# Patient Record
Sex: FEMALE | Race: WHITE | NOT HISPANIC OR LATINO | Employment: OTHER | URBAN - NONMETROPOLITAN AREA
[De-identification: names, ages, dates, MRNs, and addresses within clinical notes are randomized per-mention and may not be internally consistent; named-entity substitution may affect disease eponyms.]

---

## 2017-01-25 ENCOUNTER — TRANSCRIBE ORDERS (OUTPATIENT)
Dept: ADMINISTRATIVE | Facility: HOSPITAL | Age: 67
End: 2017-01-25

## 2017-01-25 DIAGNOSIS — Z12.31 VISIT FOR SCREENING MAMMOGRAM: Primary | ICD-10-CM

## 2017-01-30 ENCOUNTER — HOSPITAL ENCOUNTER (OUTPATIENT)
Dept: MAMMOGRAPHY | Facility: HOSPITAL | Age: 67
Discharge: HOME | End: 2017-01-30
Attending: FAMILY MEDICINE | Admitting: FAMILY MEDICINE

## 2017-01-30 DIAGNOSIS — Z12.31 VISIT FOR SCREENING MAMMOGRAM: ICD-10-CM

## 2017-01-30 PROCEDURE — 77063 BREAST TOMOSYNTHESIS BI: CPT

## 2017-01-30 PROCEDURE — G0202 SCR MAMMO BI INCL CAD: HCPCS | Performed by: RADIOLOGY

## 2017-01-30 PROCEDURE — G0202 SCR MAMMO BI INCL CAD: HCPCS

## 2017-01-30 PROCEDURE — 77063 BREAST TOMOSYNTHESIS BI: CPT | Performed by: RADIOLOGY

## 2017-02-06 ENCOUNTER — TRANSCRIBE ORDERS (OUTPATIENT)
Dept: ADMINISTRATIVE | Facility: HOSPITAL | Age: 67
End: 2017-02-06

## 2017-02-06 DIAGNOSIS — M79.671 RIGHT FOOT PAIN: Primary | ICD-10-CM

## 2017-02-10 ENCOUNTER — HOSPITAL ENCOUNTER (OUTPATIENT)
Dept: NUCLEAR MEDICINE | Facility: HOSPITAL | Age: 67
Discharge: HOME | End: 2017-02-10

## 2017-02-10 DIAGNOSIS — M79.671 RIGHT FOOT PAIN: ICD-10-CM

## 2017-02-10 PROCEDURE — A9561 TC99M OXIDRONATE: HCPCS | Performed by: NURSE PRACTITIONER

## 2017-02-10 PROCEDURE — 78300 BONE IMAGING LIMITED AREA: CPT | Performed by: RADIOLOGY

## 2017-02-10 PROCEDURE — 0 TECHNETIUM OXIDRONATE KIT: Performed by: NURSE PRACTITIONER

## 2017-02-10 PROCEDURE — 78300 BONE IMAGING LIMITED AREA: CPT

## 2017-02-10 RX ADMIN — TECHNETIUM TC 99M OXIDRONATE 1 DOSE: 3.15 INJECTION, POWDER, LYOPHILIZED, FOR SOLUTION INTRAVENOUS at 07:10

## 2017-03-02 ENCOUNTER — TRANSCRIBE ORDERS (OUTPATIENT)
Dept: ADMINISTRATIVE | Facility: HOSPITAL | Age: 67
End: 2017-03-02

## 2017-03-02 DIAGNOSIS — M84.374A STRESS FRACTURE OF RIGHT FOOT, INITIAL ENCOUNTER: ICD-10-CM

## 2017-03-02 DIAGNOSIS — R60.9 SWELLING: Primary | ICD-10-CM

## 2017-03-07 ENCOUNTER — HOSPITAL ENCOUNTER (OUTPATIENT)
Dept: CARDIOLOGY | Facility: HOSPITAL | Age: 67
Discharge: HOME | End: 2017-03-07
Attending: FAMILY MEDICINE | Admitting: FAMILY MEDICINE

## 2017-03-07 ENCOUNTER — HOSPITAL ENCOUNTER (OUTPATIENT)
Dept: MRI IMAGING | Facility: HOSPITAL | Age: 67
Discharge: HOME | End: 2017-03-07
Attending: FAMILY MEDICINE

## 2017-03-07 DIAGNOSIS — R60.9 SWELLING: ICD-10-CM

## 2017-03-07 DIAGNOSIS — M84.374A STRESS FRACTURE OF RIGHT FOOT, INITIAL ENCOUNTER: ICD-10-CM

## 2017-03-07 PROCEDURE — 93971 EXTREMITY STUDY: CPT

## 2017-03-07 PROCEDURE — 73718 MRI LOWER EXTREMITY W/O DYE: CPT | Performed by: RADIOLOGY

## 2017-03-07 PROCEDURE — 73718 MRI LOWER EXTREMITY W/O DYE: CPT

## 2017-03-07 PROCEDURE — 93971 EXTREMITY STUDY: CPT | Performed by: RADIOLOGY

## 2017-08-13 ENCOUNTER — HOSPITAL ENCOUNTER (INPATIENT)
Facility: HOSPITAL | Age: 67
LOS: 1 days | Discharge: ANOTHER HOSPITAL | End: 2017-08-15
Attending: FAMILY MEDICINE | Admitting: FAMILY MEDICINE

## 2017-08-13 ENCOUNTER — APPOINTMENT (OUTPATIENT)
Dept: GENERAL RADIOLOGY | Facility: HOSPITAL | Age: 67
End: 2017-08-13

## 2017-08-13 DIAGNOSIS — R07.9 CHEST PAIN AT REST: Primary | ICD-10-CM

## 2017-08-13 LAB
ALBUMIN SERPL-MCNC: 4.5 G/DL (ref 3.4–4.8)
ALBUMIN/GLOB SERPL: 1.9 G/DL (ref 1.5–2.5)
ALP SERPL-CCNC: 90 U/L (ref 35–104)
ALT SERPL W P-5'-P-CCNC: 25 U/L (ref 10–36)
ANION GAP SERPL CALCULATED.3IONS-SCNC: 7.5 MMOL/L (ref 3.6–11.2)
APTT PPP: 29.9 SECONDS (ref 23.8–36.1)
AST SERPL-CCNC: 23 U/L (ref 10–30)
BASOPHILS # BLD AUTO: 0.01 10*3/MM3 (ref 0–0.3)
BASOPHILS # BLD AUTO: 0.01 10*3/MM3 (ref 0–0.3)
BASOPHILS NFR BLD AUTO: 0.2 % (ref 0–2)
BASOPHILS NFR BLD AUTO: 0.2 % (ref 0–2)
BILIRUB SERPL-MCNC: 0.8 MG/DL (ref 0.2–1.8)
BUN BLD-MCNC: 14 MG/DL (ref 7–21)
BUN/CREAT SERPL: 19.4 (ref 7–25)
CALCIUM SPEC-SCNC: 9.5 MG/DL (ref 7.7–10)
CHLORIDE SERPL-SCNC: 106 MMOL/L (ref 99–112)
CHOLEST SERPL-MCNC: 242 MG/DL (ref 0–200)
CK MB SERPL-CCNC: 11.35 NG/ML (ref 0–5)
CK MB SERPL-CCNC: 5.9 NG/ML (ref 0–5)
CK MB SERPL-RTO: 4.5 % (ref 0–3)
CK MB SERPL-RTO: 8.2 % (ref 0–3)
CK SERPL-CCNC: 130 U/L (ref 24–173)
CK SERPL-CCNC: 138 U/L (ref 24–173)
CO2 SERPL-SCNC: 27.5 MMOL/L (ref 24.3–31.9)
CREAT BLD-MCNC: 0.72 MG/DL (ref 0.43–1.29)
DEPRECATED RDW RBC AUTO: 44.8 FL (ref 37–54)
DEPRECATED RDW RBC AUTO: 45.3 FL (ref 37–54)
EOSINOPHIL # BLD AUTO: 0.03 10*3/MM3 (ref 0–0.7)
EOSINOPHIL # BLD AUTO: 0.09 10*3/MM3 (ref 0–0.7)
EOSINOPHIL NFR BLD AUTO: 0.5 % (ref 0–7)
EOSINOPHIL NFR BLD AUTO: 2.1 % (ref 0–7)
ERYTHROCYTE [DISTWIDTH] IN BLOOD BY AUTOMATED COUNT: 14.2 % (ref 11.5–14.5)
ERYTHROCYTE [DISTWIDTH] IN BLOOD BY AUTOMATED COUNT: 14.3 % (ref 11.5–14.5)
GFR SERPL CREATININE-BSD FRML MDRD: 81 ML/MIN/1.73
GLOBULIN UR ELPH-MCNC: 2.4 GM/DL
GLUCOSE BLD-MCNC: 183 MG/DL (ref 70–110)
HCT VFR BLD AUTO: 39.7 % (ref 37–47)
HCT VFR BLD AUTO: 40.3 % (ref 37–47)
HDLC SERPL-MCNC: 42 MG/DL (ref 60–100)
HGB BLD-MCNC: 12.9 G/DL (ref 12–16)
HGB BLD-MCNC: 12.9 G/DL (ref 12–16)
HOLD SPECIMEN: NORMAL
HOLD SPECIMEN: NORMAL
IMM GRANULOCYTES # BLD: 0 10*3/MM3 (ref 0–0.03)
IMM GRANULOCYTES # BLD: 0.01 10*3/MM3 (ref 0–0.03)
IMM GRANULOCYTES NFR BLD: 0 % (ref 0–0.5)
IMM GRANULOCYTES NFR BLD: 0.2 % (ref 0–0.5)
INR PPP: 0.96 (ref 0.9–1.1)
INR PPP: 0.97 (ref 0.9–1.1)
LDLC SERPL CALC-MCNC: 167 MG/DL (ref 0–100)
LDLC/HDLC SERPL: 3.97 {RATIO}
LYMPHOCYTES # BLD AUTO: 1.1 10*3/MM3 (ref 1–3)
LYMPHOCYTES # BLD AUTO: 1.14 10*3/MM3 (ref 1–3)
LYMPHOCYTES NFR BLD AUTO: 20.8 % (ref 16–46)
LYMPHOCYTES NFR BLD AUTO: 25.3 % (ref 16–46)
MCH RBC QN AUTO: 28 PG (ref 27–33)
MCH RBC QN AUTO: 28.3 PG (ref 27–33)
MCHC RBC AUTO-ENTMCNC: 32 G/DL (ref 33–37)
MCHC RBC AUTO-ENTMCNC: 32.5 G/DL (ref 33–37)
MCV RBC AUTO: 87.1 FL (ref 80–94)
MCV RBC AUTO: 87.4 FL (ref 80–94)
MONOCYTES # BLD AUTO: 0.28 10*3/MM3 (ref 0.1–0.9)
MONOCYTES # BLD AUTO: 0.29 10*3/MM3 (ref 0.1–0.9)
MONOCYTES NFR BLD AUTO: 5.3 % (ref 0–12)
MONOCYTES NFR BLD AUTO: 6.5 % (ref 0–12)
NEUTROPHILS # BLD AUTO: 2.86 10*3/MM3 (ref 1.4–6.5)
NEUTROPHILS # BLD AUTO: 4 10*3/MM3 (ref 1.4–6.5)
NEUTROPHILS NFR BLD AUTO: 65.9 % (ref 40–75)
NEUTROPHILS NFR BLD AUTO: 73 % (ref 40–75)
OSMOLALITY SERPL CALC.SUM OF ELEC: 286.4 MOSM/KG (ref 273–305)
PLATELET # BLD AUTO: 157 10*3/MM3 (ref 130–400)
PLATELET # BLD AUTO: 160 10*3/MM3 (ref 130–400)
PMV BLD AUTO: 11 FL (ref 6–10)
PMV BLD AUTO: 11.3 FL (ref 6–10)
POTASSIUM BLD-SCNC: 3.9 MMOL/L (ref 3.5–5.3)
PROT SERPL-MCNC: 6.9 G/DL (ref 6–8)
PROTHROMBIN TIME: 12.9 SECONDS (ref 11–15.4)
PROTHROMBIN TIME: 13 SECONDS (ref 11–15.4)
RBC # BLD AUTO: 4.56 10*6/MM3 (ref 4.2–5.4)
RBC # BLD AUTO: 4.61 10*6/MM3 (ref 4.2–5.4)
SODIUM BLD-SCNC: 141 MMOL/L (ref 135–153)
TRIGL SERPL-MCNC: 166 MG/DL (ref 0–150)
TROPONIN I SERPL-MCNC: 0.27 NG/ML
TROPONIN I SERPL-MCNC: 2.98 NG/ML
TROPONIN I SERPL-MCNC: 4.92 NG/ML
VLDLC SERPL-MCNC: 33.2 MG/DL
WBC NRBC COR # BLD: 4.34 10*3/MM3 (ref 4.5–12.5)
WBC NRBC COR # BLD: 5.48 10*3/MM3 (ref 4.5–12.5)
WHOLE BLOOD HOLD SPECIMEN: NORMAL
WHOLE BLOOD HOLD SPECIMEN: NORMAL

## 2017-08-13 PROCEDURE — 71010 HC CHEST PA OR AP: CPT

## 2017-08-13 PROCEDURE — 93005 ELECTROCARDIOGRAM TRACING: CPT | Performed by: INTERNAL MEDICINE

## 2017-08-13 PROCEDURE — 84484 ASSAY OF TROPONIN QUANT: CPT | Performed by: FAMILY MEDICINE

## 2017-08-13 PROCEDURE — G0378 HOSPITAL OBSERVATION PER HR: HCPCS

## 2017-08-13 PROCEDURE — 85730 THROMBOPLASTIN TIME PARTIAL: CPT | Performed by: INTERNAL MEDICINE

## 2017-08-13 PROCEDURE — 71010 XR CHEST 1 VW: CPT | Performed by: RADIOLOGY

## 2017-08-13 PROCEDURE — 94799 UNLISTED PULMONARY SVC/PX: CPT

## 2017-08-13 PROCEDURE — 80053 COMPREHEN METABOLIC PANEL: CPT | Performed by: PHYSICIAN ASSISTANT

## 2017-08-13 PROCEDURE — 99284 EMERGENCY DEPT VISIT MOD MDM: CPT

## 2017-08-13 PROCEDURE — 82550 ASSAY OF CK (CPK): CPT | Performed by: FAMILY MEDICINE

## 2017-08-13 PROCEDURE — 99222 1ST HOSP IP/OBS MODERATE 55: CPT | Performed by: INTERNAL MEDICINE

## 2017-08-13 PROCEDURE — 80061 LIPID PANEL: CPT | Performed by: PHYSICIAN ASSISTANT

## 2017-08-13 PROCEDURE — 93010 ELECTROCARDIOGRAM REPORT: CPT | Performed by: INTERNAL MEDICINE

## 2017-08-13 PROCEDURE — 85025 COMPLETE CBC W/AUTO DIFF WBC: CPT | Performed by: INTERNAL MEDICINE

## 2017-08-13 PROCEDURE — 25010000002 HEPARIN (PORCINE) PER 1000 UNITS

## 2017-08-13 PROCEDURE — 85610 PROTHROMBIN TIME: CPT | Performed by: INTERNAL MEDICINE

## 2017-08-13 PROCEDURE — 82553 CREATINE MB FRACTION: CPT | Performed by: FAMILY MEDICINE

## 2017-08-13 PROCEDURE — 93005 ELECTROCARDIOGRAM TRACING: CPT | Performed by: PHYSICIAN ASSISTANT

## 2017-08-13 PROCEDURE — 82553 CREATINE MB FRACTION: CPT | Performed by: PHYSICIAN ASSISTANT

## 2017-08-13 PROCEDURE — 82550 ASSAY OF CK (CPK): CPT | Performed by: PHYSICIAN ASSISTANT

## 2017-08-13 PROCEDURE — 85025 COMPLETE CBC W/AUTO DIFF WBC: CPT | Performed by: PHYSICIAN ASSISTANT

## 2017-08-13 PROCEDURE — 84484 ASSAY OF TROPONIN QUANT: CPT | Performed by: PHYSICIAN ASSISTANT

## 2017-08-13 PROCEDURE — 85610 PROTHROMBIN TIME: CPT | Performed by: PHYSICIAN ASSISTANT

## 2017-08-13 RX ORDER — ATORVASTATIN CALCIUM 40 MG/1
80 TABLET, FILM COATED ORAL NIGHTLY
Status: DISCONTINUED | OUTPATIENT
Start: 2017-08-14 | End: 2017-08-15 | Stop reason: HOSPADM

## 2017-08-13 RX ORDER — HEPARIN SODIUM 5000 [USP'U]/ML
5000 INJECTION, SOLUTION INTRAVENOUS; SUBCUTANEOUS EVERY 8 HOURS SCHEDULED
Status: DISCONTINUED | OUTPATIENT
Start: 2017-08-13 | End: 2017-08-14

## 2017-08-13 RX ORDER — HEPARIN SODIUM 5000 [USP'U]/ML
30 INJECTION, SOLUTION INTRAVENOUS; SUBCUTANEOUS AS NEEDED
Status: DISCONTINUED | OUTPATIENT
Start: 2017-08-13 | End: 2017-08-14

## 2017-08-13 RX ORDER — LISINOPRIL 10 MG/1
40 TABLET ORAL DAILY
Status: DISCONTINUED | OUTPATIENT
Start: 2017-08-14 | End: 2017-08-15 | Stop reason: HOSPADM

## 2017-08-13 RX ORDER — PANTOPRAZOLE SODIUM 40 MG/1
40 TABLET, DELAYED RELEASE ORAL
Status: DISCONTINUED | OUTPATIENT
Start: 2017-08-14 | End: 2017-08-15 | Stop reason: HOSPADM

## 2017-08-13 RX ORDER — ASPIRIN 325 MG
325 TABLET ORAL ONCE
Status: COMPLETED | OUTPATIENT
Start: 2017-08-13 | End: 2017-08-13

## 2017-08-13 RX ORDER — LISINOPRIL 40 MG/1
40 TABLET ORAL DAILY
COMMUNITY
End: 2017-08-17 | Stop reason: HOSPADM

## 2017-08-13 RX ORDER — PANTOPRAZOLE SODIUM 40 MG/1
40 TABLET, DELAYED RELEASE ORAL 2 TIMES DAILY PRN
COMMUNITY

## 2017-08-13 RX ORDER — NITROGLYCERIN 0.4 MG/1
0.4 TABLET SUBLINGUAL
Status: DISCONTINUED | OUTPATIENT
Start: 2017-08-13 | End: 2017-08-15 | Stop reason: HOSPADM

## 2017-08-13 RX ORDER — ASPIRIN 81 MG/1
81 TABLET ORAL DAILY
COMMUNITY
End: 2022-04-04 | Stop reason: SDUPTHER

## 2017-08-13 RX ORDER — HEPARIN SODIUM 5000 [USP'U]/ML
60 INJECTION, SOLUTION INTRAVENOUS; SUBCUTANEOUS ONCE
Status: DISCONTINUED | OUTPATIENT
Start: 2017-08-13 | End: 2017-08-14

## 2017-08-13 RX ORDER — ZOLPIDEM TARTRATE 5 MG/1
5 TABLET ORAL NIGHTLY PRN
Status: DISCONTINUED | OUTPATIENT
Start: 2017-08-13 | End: 2017-08-15 | Stop reason: HOSPADM

## 2017-08-13 RX ORDER — CARVEDILOL 3.12 MG/1
3.12 TABLET ORAL EVERY 12 HOURS SCHEDULED
Status: DISCONTINUED | OUTPATIENT
Start: 2017-08-14 | End: 2017-08-15 | Stop reason: HOSPADM

## 2017-08-13 RX ORDER — HEPARIN SODIUM 5000 [USP'U]/ML
60 INJECTION, SOLUTION INTRAVENOUS; SUBCUTANEOUS AS NEEDED
Status: DISCONTINUED | OUTPATIENT
Start: 2017-08-13 | End: 2017-08-14

## 2017-08-13 RX ORDER — SODIUM CHLORIDE 0.9 % (FLUSH) 0.9 %
10 SYRINGE (ML) INJECTION AS NEEDED
Status: DISCONTINUED | OUTPATIENT
Start: 2017-08-13 | End: 2017-08-15 | Stop reason: HOSPADM

## 2017-08-13 RX ORDER — CHLORAL HYDRATE 500 MG
1000 CAPSULE ORAL
COMMUNITY

## 2017-08-13 RX ORDER — CLOPIDOGREL BISULFATE 75 MG/1
300 TABLET ORAL ONCE
Status: COMPLETED | OUTPATIENT
Start: 2017-08-13 | End: 2017-08-13

## 2017-08-13 RX ORDER — CLOPIDOGREL BISULFATE 75 MG/1
TABLET ORAL
Status: COMPLETED
Start: 2017-08-13 | End: 2017-08-13

## 2017-08-13 RX ORDER — ASPIRIN 81 MG/1
81 TABLET ORAL DAILY
Status: DISCONTINUED | OUTPATIENT
Start: 2017-08-14 | End: 2017-08-15 | Stop reason: HOSPADM

## 2017-08-13 RX ORDER — NITROGLYCERIN 0.4 MG/1
0.4 TABLET SUBLINGUAL
Status: DISCONTINUED | OUTPATIENT
Start: 2017-08-13 | End: 2017-08-13 | Stop reason: SDUPTHER

## 2017-08-13 RX ADMIN — CLOPIDOGREL BISULFATE 300 MG: 75 TABLET ORAL at 22:09

## 2017-08-13 RX ADMIN — CLOPIDOGREL BISULFATE 300 MG: 75 TABLET, FILM COATED ORAL at 22:09

## 2017-08-13 RX ADMIN — ASPIRIN 325 MG: 325 TABLET ORAL at 18:52

## 2017-08-13 RX ADMIN — NITROGLYCERIN 1 INCH: 20 OINTMENT TOPICAL at 19:37

## 2017-08-13 RX ADMIN — NITROGLYCERIN 0.4 MG: 0.4 TABLET SUBLINGUAL at 19:02

## 2017-08-13 RX ADMIN — NITROGLYCERIN 0.4 MG: 0.4 TABLET SUBLINGUAL at 18:52

## 2017-08-13 RX ADMIN — NITROGLYCERIN 0.4 MG: 0.4 TABLET SUBLINGUAL at 18:57

## 2017-08-13 RX ADMIN — HEPARIN SODIUM 12 UNITS/KG/HR: 10000 INJECTION, SOLUTION INTRAVENOUS at 22:11

## 2017-08-13 NOTE — ED PROVIDER NOTES
Subjective   Patient is a 66 y.o. female presenting with chest pain.   Chest Pain   Pain location:  Substernal area  Pain quality: tightness    Pain radiates to:  Neck  Onset quality:  Gradual  Duration:  11 hours  Timing:  Intermittent  Progression:  Waxing and waning  Chronicity:  New  Context: not breathing    Relieved by:  Nothing  Worsened by:  Nothing  Associated symptoms: diaphoresis, nausea and shortness of breath    Associated symptoms: no abdominal pain, no fever and no vomiting    Risk factors: hypertension        Review of Systems   Constitutional: Positive for diaphoresis. Negative for fever.   HENT: Negative.    Respiratory: Positive for shortness of breath.    Cardiovascular: Positive for chest pain.   Gastrointestinal: Positive for nausea. Negative for abdominal pain and vomiting.   Endocrine: Negative.    Genitourinary: Negative.  Negative for dysuria.   Skin: Negative.    Neurological: Negative.    Psychiatric/Behavioral: Negative.    All other systems reviewed and are negative.      Past Medical History:   Diagnosis Date   • GERD (gastroesophageal reflux disease)    • Hypertension        Allergies   Allergen Reactions   • Codeine        Past Surgical History:   Procedure Laterality Date   • HYSTERECTOMY         History reviewed. No pertinent family history.    Social History     Social History   • Marital status:      Spouse name: N/A   • Number of children: N/A   • Years of education: N/A     Social History Main Topics   • Smoking status: Never Smoker   • Smokeless tobacco: None   • Alcohol use No   • Drug use: No   • Sexual activity: Not Asked     Other Topics Concern   • None     Social History Narrative   • None           Objective   Physical Exam   Constitutional: She is oriented to person, place, and time. She appears well-developed and well-nourished. No distress.   HENT:   Head: Normocephalic and atraumatic.   Right Ear: External ear normal.   Left Ear: External ear normal.   Nose:  Nose normal.   Eyes: Conjunctivae and EOM are normal. Pupils are equal, round, and reactive to light.   Neck: Normal range of motion. Neck supple. No JVD present. No tracheal deviation present.   Cardiovascular: Normal rate, regular rhythm and normal heart sounds.    No murmur heard.  Pulmonary/Chest: Effort normal and breath sounds normal. No respiratory distress. She has no wheezes.   Abdominal: Soft. Bowel sounds are normal. There is no tenderness.   Musculoskeletal: Normal range of motion. She exhibits no edema or deformity.   Neurological: She is alert and oriented to person, place, and time. No cranial nerve deficit.   Skin: Skin is warm and dry. No rash noted. She is not diaphoretic. No erythema. No pallor.   Psychiatric: She has a normal mood and affect. Her behavior is normal. Thought content normal.   Nursing note and vitals reviewed.      Procedures         ED Course  ED Course   Value Comment By Time   XR Chest 1 View Negative per ALEX Rooney 08/13 1926   ECG 12 Lead Normal sinus rhythm first degree AV block no acute ST changes per ALEX Morocho 08/13 1927    Paged ALEX Donald 08/13 1938            HEART Score  History: Highly suspicious (+2)  ECG: Non specific repolarization disturbance (+1)  Age: Greater than or equal to 65 (+2)  Risk Factors: 1 - 2 risk factors (+1)  Troponin: Normal limit or lower (+0)  Total: 6         MDM  Number of Diagnoses or Management Options  new and requires workup     Amount and/or Complexity of Data Reviewed  Clinical lab tests: reviewed and ordered  Tests in the radiology section of CPT®: reviewed and ordered  Tests in the medicine section of CPT®: reviewed and ordered  Discuss the patient with other providers: yes  Independent visualization of images, tracings, or specimens: yes    Risk of Complications, Morbidity, and/or Mortality  Presenting problems: moderate        Final diagnoses:   Chest pain at rest             ALEX Perry  08/13/17 2001

## 2017-08-14 ENCOUNTER — APPOINTMENT (OUTPATIENT)
Dept: CARDIOLOGY | Facility: HOSPITAL | Age: 67
End: 2017-08-14
Attending: INTERNAL MEDICINE

## 2017-08-14 LAB
APTT PPP: 54.1 SECONDS (ref 23.8–36.1)
BH CV ECHO MEAS - ACS: 1.5 CM
BH CV ECHO MEAS - AO MAX PG (FULL): 1.3 MMHG
BH CV ECHO MEAS - AO MAX PG: 5.3 MMHG
BH CV ECHO MEAS - AO MEAN PG (FULL): 0.78 MMHG
BH CV ECHO MEAS - AO MEAN PG: 2.7 MMHG
BH CV ECHO MEAS - AO ROOT AREA (BSA CORRECTED): 2.1
BH CV ECHO MEAS - AO ROOT AREA: 8.7 CM^2
BH CV ECHO MEAS - AO ROOT DIAM: 3.3 CM
BH CV ECHO MEAS - AO V2 MAX: 115 CM/SEC
BH CV ECHO MEAS - AO V2 MEAN: 77.7 CM/SEC
BH CV ECHO MEAS - AO V2 VTI: 21.3 CM
BH CV ECHO MEAS - BSA(HAYCOCK): 1.6 M^2
BH CV ECHO MEAS - BSA: 1.6 M^2
BH CV ECHO MEAS - BZI_BMI: 23.9 KILOGRAMS/M^2
BH CV ECHO MEAS - BZI_METRIC_HEIGHT: 157 CM
BH CV ECHO MEAS - BZI_METRIC_WEIGHT: 59 KG
BH CV ECHO MEAS - EDV(CUBED): 121.1 ML
BH CV ECHO MEAS - EDV(MOD-SP4): 103 ML
BH CV ECHO MEAS - EDV(TEICH): 115.4 ML
BH CV ECHO MEAS - EF(CUBED): 65.4 %
BH CV ECHO MEAS - EF(TEICH): 56.7 %
BH CV ECHO MEAS - ESV(CUBED): 41.9 ML
BH CV ECHO MEAS - ESV(TEICH): 50 ML
BH CV ECHO MEAS - FS: 29.8 %
BH CV ECHO MEAS - IVS/LVPW: 1.9
BH CV ECHO MEAS - IVSD: 1.2 CM
BH CV ECHO MEAS - LA DIMENSION: 3.2 CM
BH CV ECHO MEAS - LA/AO: 0.97
BH CV ECHO MEAS - LV DIASTOLIC VOL/BSA (35-75): 64.8 ML/M^2
BH CV ECHO MEAS - LV MASS(C)D: 160 GRAMS
BH CV ECHO MEAS - LV MASS(C)DI: 100.7 GRAMS/M^2
BH CV ECHO MEAS - LV MAX PG: 3.9 MMHG
BH CV ECHO MEAS - LV MEAN PG: 1.9 MMHG
BH CV ECHO MEAS - LV V1 MAX: 99.3 CM/SEC
BH CV ECHO MEAS - LV V1 MEAN: 64.9 CM/SEC
BH CV ECHO MEAS - LV V1 VTI: 19.6 CM
BH CV ECHO MEAS - LVIDD: 4.9 CM
BH CV ECHO MEAS - LVIDS: 3.5 CM
BH CV ECHO MEAS - LVPWD: 0.63 CM
BH CV ECHO MEAS - MV A MAX VEL: 70.8 CM/SEC
BH CV ECHO MEAS - MV DEC TIME: 0.28 SEC
BH CV ECHO MEAS - MV E MAX VEL: 58.2 CM/SEC
BH CV ECHO MEAS - MV E/A: 0.82
BH CV ECHO MEAS - MV MAX PG: 2.1 MMHG
BH CV ECHO MEAS - MV MEAN PG: 0.78 MMHG
BH CV ECHO MEAS - MV V2 MAX: 72 CM/SEC
BH CV ECHO MEAS - MV V2 MEAN: 41.6 CM/SEC
BH CV ECHO MEAS - MV V2 VTI: 23 CM
BH CV ECHO MEAS - PA ACC TIME: 0.11 SEC
BH CV ECHO MEAS - PA MAX PG: 3.5 MMHG
BH CV ECHO MEAS - PA MEAN PG: 2 MMHG
BH CV ECHO MEAS - PA PR(ACCEL): 28.4 MMHG
BH CV ECHO MEAS - PA V2 MAX: 94.2 CM/SEC
BH CV ECHO MEAS - PA V2 MEAN: 68.5 CM/SEC
BH CV ECHO MEAS - PA V2 VTI: 18.8 CM
BH CV ECHO MEAS - RAP SYSTOLE: 10 MMHG
BH CV ECHO MEAS - RVDD: 2.6 CM
BH CV ECHO MEAS - RVSP: 17.4 MMHG
BH CV ECHO MEAS - SI(AO): 116.5 ML/M^2
BH CV ECHO MEAS - SI(CUBED): 49.9 ML/M^2
BH CV ECHO MEAS - SI(TEICH): 41.2 ML/M^2
BH CV ECHO MEAS - SV(AO): 185 ML
BH CV ECHO MEAS - SV(CUBED): 79.2 ML
BH CV ECHO MEAS - SV(TEICH): 65.4 ML
BH CV ECHO MEAS - TR MAX VEL: 136.1 CM/SEC
LV EF 2D ECHO EST: 45 %
TROPONIN I SERPL-MCNC: 5.15 NG/ML
TROPONIN I SERPL-MCNC: 6.01 NG/ML

## 2017-08-14 PROCEDURE — 25010000002 HEPARIN (PORCINE) PER 1000 UNITS: Performed by: INTERNAL MEDICINE

## 2017-08-14 PROCEDURE — 93005 ELECTROCARDIOGRAM TRACING: CPT | Performed by: INTERNAL MEDICINE

## 2017-08-14 PROCEDURE — 93308 TTE F-UP OR LMTD: CPT | Performed by: INTERNAL MEDICINE

## 2017-08-14 PROCEDURE — 99232 SBSQ HOSP IP/OBS MODERATE 35: CPT | Performed by: INTERNAL MEDICINE

## 2017-08-14 PROCEDURE — 84484 ASSAY OF TROPONIN QUANT: CPT | Performed by: FAMILY MEDICINE

## 2017-08-14 PROCEDURE — 84484 ASSAY OF TROPONIN QUANT: CPT | Performed by: INTERNAL MEDICINE

## 2017-08-14 PROCEDURE — 93306 TTE W/DOPPLER COMPLETE: CPT

## 2017-08-14 PROCEDURE — 94799 UNLISTED PULMONARY SVC/PX: CPT

## 2017-08-14 PROCEDURE — 85730 THROMBOPLASTIN TIME PARTIAL: CPT | Performed by: INTERNAL MEDICINE

## 2017-08-14 PROCEDURE — 93010 ELECTROCARDIOGRAM REPORT: CPT | Performed by: INTERNAL MEDICINE

## 2017-08-14 RX ORDER — CLOPIDOGREL BISULFATE 75 MG/1
TABLET ORAL
Status: COMPLETED
Start: 2017-08-14 | End: 2017-08-14

## 2017-08-14 RX ORDER — CLOPIDOGREL BISULFATE 75 MG/1
75 TABLET ORAL DAILY
Status: DISCONTINUED | OUTPATIENT
Start: 2017-08-14 | End: 2017-08-15 | Stop reason: HOSPADM

## 2017-08-14 RX ORDER — PHENAZOPYRIDINE HYDROCHLORIDE 100 MG/1
100 TABLET, FILM COATED ORAL
Status: DISCONTINUED | OUTPATIENT
Start: 2017-08-14 | End: 2017-08-15 | Stop reason: HOSPADM

## 2017-08-14 RX ADMIN — PHENAZOPYRIDINE HYDROCHLORIDE 100 MG: 100 TABLET ORAL at 12:41

## 2017-08-14 RX ADMIN — CLOPIDOGREL BISULFATE 75 MG: 75 TABLET ORAL at 12:41

## 2017-08-14 RX ADMIN — HEPARIN SODIUM 14 UNITS/KG/HR: 10000 INJECTION, SOLUTION INTRAVENOUS at 05:04

## 2017-08-14 RX ADMIN — PANTOPRAZOLE SODIUM 40 MG: 40 TABLET, DELAYED RELEASE ORAL at 06:39

## 2017-08-14 RX ADMIN — CARVEDILOL 3.12 MG: 3.12 TABLET, FILM COATED ORAL at 00:47

## 2017-08-14 RX ADMIN — CARVEDILOL 3.12 MG: 3.12 TABLET, FILM COATED ORAL at 20:42

## 2017-08-14 RX ADMIN — PHENAZOPYRIDINE HYDROCHLORIDE 100 MG: 100 TABLET ORAL at 17:37

## 2017-08-14 RX ADMIN — CLOPIDOGREL BISULFATE 75 MG: 75 TABLET, FILM COATED ORAL at 12:41

## 2017-08-14 RX ADMIN — CARVEDILOL 3.12 MG: 3.12 TABLET, FILM COATED ORAL at 08:29

## 2017-08-14 RX ADMIN — ATORVASTATIN CALCIUM 80 MG: 40 TABLET, FILM COATED ORAL at 00:47

## 2017-08-14 RX ADMIN — ASPIRIN 81 MG: 81 TABLET ORAL at 08:29

## 2017-08-14 RX ADMIN — ATORVASTATIN CALCIUM 80 MG: 40 TABLET, FILM COATED ORAL at 20:43

## 2017-08-14 RX ADMIN — LISINOPRIL 40 MG: 10 TABLET ORAL at 08:30

## 2017-08-14 NOTE — PROGRESS NOTES
LOS: 0 days   Patient Care Team:  Steve Martinez MD as PCP - General (Family Medicine)      Subjective     Admission information:    The patient is a 66-year-old white female with a history of hypertension and gastric esophageal reflux disease who comes to the hospital for chest pain.  According to the patient she was in her usual state of health until this morning when she started having midsternal oppressive chest pain radiating to her jaw and her left arm.  She states the pain was associated with diaphoresis and shortness of breath.  She denies any exacerbating or ameliorating factors.  She states the episodes would last 5-10 minutes and resolve spontaneously.  She states the intensity was 8/10 on the pain scale.  She states that she had an initial episode which result spontaneously.  She initially thought this was related to gastric esophageal reflux disease and gas.  However she had a second similar episode a few minutes later and she decided to come to the ER.  On evaluation she was noted to have ST-T changes on anterior and inferior leads as well as an elevated troponin at 0.27 she was admitted to the medicine service for further evaluation.  Cardiology was then consulted.  A repeat EKG showed improvement of the ST changes on anterior and inferior leads.  However her repeat troponin was noted to be 3.  At that point the patient was started on heparin infusion and she was given a dose of Plavix.  She was also transferred to the PCU for closer monitoring.    Interval History:     According to the patient she is doing well today.  She denies any chest pain, shortness of breath or palpitations.    History taken from: patient chart    Vital Signs  Temp:  [97.8 °F (36.6 °C)-98.5 °F (36.9 °C)] 97.8 °F (36.6 °C)  Heart Rate:  [60-91] 65  Resp:  [18-20] 18  BP: (116-169)/(67-99) 116/76    Physical Exam:     Physical Exam   Constitutional: She is oriented to person, place, and time. She appears well-developed and  well-nourished.   Elderly white female laying comfortably on bed.   HENT:   Mouth/Throat: Oropharynx is clear and moist.   Eyes: EOM are normal. Pupils are equal, round, and reactive to light.   Neck: Neck supple. No JVD present. No tracheal deviation present. No thyromegaly present.   Cardiovascular: Normal rate, regular rhythm, S1 normal and S2 normal.  Exam reveals no gallop and no friction rub.    No murmur heard.  Pulmonary/Chest: Effort normal and breath sounds normal. No respiratory distress. She has no wheezes. She has no rales.   Abdominal: Soft. Bowel sounds are normal. She exhibits no mass. There is no tenderness.   Musculoskeletal: Normal range of motion. She exhibits no edema.   Lymphadenopathy:     She has no cervical adenopathy.   Neurological: She is alert and oriented to person, place, and time.   Skin: Skin is warm and dry. No rash noted.   Psychiatric: She has a normal mood and affect.       Results Review:     I reviewed the patient's new clinical results.  I reviewed the patient's new imaging results and agree with the interpretation.  I reviewed the patient's other test results and agree with the interpretation  I personally viewed and interpreted the patient's EKG/Telemetry data    Medication:  Scheduled Meds:  aspirin 81 mg Oral Daily   atorvastatin 80 mg Oral Nightly   carvedilol 3.125 mg Oral Q12H   heparin (porcine) 60 Units/kg Intravenous Once   lisinopril 40 mg Oral Daily   pantoprazole 40 mg Oral Q AM   Pharmacy Meds to Bed Consult  Does not apply Daily   phenazopyridine 100 mg Oral TID With Meals     Continuous Infusions:  heparin (porcine) 12 Units/kg/hr Last Rate: 14 Units/kg/hr (08/14/17 0504)     PRN Meds:.heparin (porcine)  •  heparin (porcine)  •  nitroglycerin  •  sodium chloride  •  zolpidem    Telemetry: Sinus rhythm in the 60s to 70s.      Assessment/Plan     1.  Non-ST elevation myocardial infarction: Patient is chest pain which is fairly typical for angina at rest.  She has  undergone serial troponins were which were noted to be elevated initially and have increased rapidly.  Initial EKG showed anterior and inferior ST abnormalities that appears to have improved on subsequent EKGs.     2.  Hypertension: Patient with a history of hypertension who Is now well controlled.     3.  Dyslipidemia: Patient with history of dyslipidemia currently on no medication.  A lipid panel done today showed elevated LDL.     4.  Esophageal reflux disease: Patient with history of esophageal reflux disease currently on pantoprazole.     Plan:     1.  Non-ST elevation infarction: The patient continues to do well.  At this point will continue on aspirin, Plavix, beta blocker, ACE inhibitor and statin.  Will continue on heparin infusion due to the patient's troponin started trending down.  She will need a cardiac catheterization over the next few days.     2.  Hypertension: The patient's blood pressure appears to be well-controlled on current regimen.  This point we'll continue     3.  Dyslipidemia: At this point will start the patient on high-dose statin.  We will need to restart fish oil as an outpatient.      Stew Guy MD  08/14/17  11:08 AM    Dragon disclaimer:  Much of this encounter note is an electronic transcription/translation of spoken language to printed text. The electronic translation of spoken language may permit erroneous, or at times, nonsensical words or phrases to be inadvertently transcribed; Although I have reviewed the note for such errors, some may still exist.

## 2017-08-14 NOTE — CONSULTS
Referring Provider: Michelle  Reason for Consultation: Chest pain    Patient Care Team:  Steve Martinez MD as PCP - General (Family Medicine)    Chief complaint: Chest pain    Subjective .     History of present illness:    The patient is a 66-year-old white female with a history of hypertension and gastric esophageal reflux disease who comes to the hospital for chest pain.  According to the patient she was in her usual state of health until this morning when she started having midsternal oppressive chest pain radiating to her jaw and her left arm.  She states the pain was associated with diaphoresis and shortness of breath.  She denies any exacerbating or ameliorating factors.  She states the episodes would last 5-10 minutes and resolve spontaneously.  She states the intensity was 8/10 on the pain scale.  She states that she had an initial episode which result spontaneously.  She initially thought this was related to gastric esophageal reflux disease and gas.  However she had a second similar episode a few minutes later and she decided to come to the ER.  On evaluation she was noted to have ST-T changes on anterior and inferior leads as well as an elevated troponin at 0.27 she was admitted to the medicine service for further evaluation.  Cardiology was then consulted.  A repeat EKG showed improvement of the ST changes on anterior and inferior leads.  However her repeat troponin was noted to be 3.  At that point the patient was started on heparin infusion and she was given a dose of Plavix.  She was also transferred to the PCU for closer monitoring.    Review of Systems    Review of Systems   Constitution: Positive for diaphoresis, weakness and malaise/fatigue. Negative for chills and fever.   HENT: Negative for congestion, ear pain, hearing loss and sore throat.    Eyes: Positive for blurred vision. Negative for pain and redness.   Cardiovascular: Positive for chest pain. Negative for leg swelling, orthopnea,  palpitations, paroxysmal nocturnal dyspnea and syncope.   Respiratory: Positive for shortness of breath. Negative for cough, hemoptysis, sputum production and wheezing.    Endocrine: Negative for cold intolerance and heat intolerance.   Hematologic/Lymphatic: Does not bruise/bleed easily.   Skin: Negative for rash.   Musculoskeletal: Negative for arthritis, back pain, joint pain, joint swelling, myalgias and stiffness.   Gastrointestinal: Positive for heartburn. Negative for abdominal pain, constipation, diarrhea, hematemesis, hematochezia, melena, nausea and vomiting.   Genitourinary: Negative for dysuria and hematuria.   Neurological: Negative for dizziness, focal weakness and numbness.   Psychiatric/Behavioral: Positive for depression. The patient is nervous/anxious.        History    Past Medical History:   Diagnosis Date   • GERD (gastroesophageal reflux disease)    • Hypertension         Past Surgical History:   Procedure Laterality Date   • HYSTERECTOMY         History reviewed. No pertinent family history.     Social History   Substance Use Topics   • Smoking status: Never Smoker   • Smokeless tobacco: None   • Alcohol use No       Prescriptions Prior to Admission   Medication Sig Dispense Refill Last Dose   • aspirin 81 MG EC tablet Take 81 mg by mouth Daily.   8/13/2017 at AM   • lisinopril (PRINIVIL,ZESTRIL) 40 MG tablet Take 40 mg by mouth Daily.   8/13/2017 at AM   • Omega-3 Fatty Acids (FISH OIL) 1000 MG capsule capsule Take 1,000 mg by mouth Daily With Breakfast.   8/13/2017 at AM   • pantoprazole (PROTONIX) 40 MG EC tablet Take 40 mg by mouth Daily.   8/13/2017 at AM         Scheduled Meds:        [START ON 8/14/2017] aspirin 81 mg Oral Daily   heparin (porcine) 60 Units/kg Intravenous Once   heparin (porcine) 5,000 Units Subcutaneous Q8H   [START ON 8/14/2017] lisinopril 40 mg Oral Daily   [START ON 8/14/2017] pantoprazole 40 mg Oral Q AM   [START ON 8/14/2017] Pharmacy Meds to Bed Consult  Does not  "apply Daily   , Continuous Infusions:    heparin (porcine) 12 Units/kg/hr Last Rate: 12 Units/kg/hr (08/13/17 2211)   , PRN Meds:      heparin (porcine)  •  heparin (porcine)  •  nitroglycerin  •  sodium chloride  •  zolpidem and Allergies:  Codeine    Objective     Vital Sign Min/Max for last 24 hours  Temp  Min: 98.1 °F (36.7 °C)  Max: 98.5 °F (36.9 °C)   BP  Min: 133/67  Max: 168/87   Pulse  Min: 64  Max: 76   Resp  Min: 18  Max: 20   SpO2  Min: 96 %  Max: 100 %   Flow (L/min)  Min: 2  Max: 2   Weight  Min: 132 lb (59.9 kg)  Max: 133 lb 12.8 oz (60.7 kg)     Flowsheet Rows         First Filed Value    Admission Height  62\" (157.5 cm) Documented at 08/13/2017 1823    Admission Weight  132 lb (59.9 kg) Documented at 08/13/2017 1823             Ejection Fraction  No results found for: EF    Echo EF Estimated  No results found for: ECHOEFEST    Nuclear Stress Ejection Fraction  No components found for: NUCEF    Cath Ejection Fraction Quantitative  No results found for: CATHEF    Physical Exam   Constitutional: She is oriented to person, place, and time. She appears well-developed and well-nourished.   Elderly white female lying comfortably on bed.   HENT:   Mouth/Throat: Oropharynx is clear and moist.   Eyes: EOM are normal. Pupils are equal, round, and reactive to light.   Neck: Neck supple. No JVD present. No tracheal deviation present. No thyromegaly present.   Cardiovascular: Normal rate, regular rhythm, S1 normal and S2 normal.  Exam reveals no gallop and no friction rub.    No murmur heard.  Pulmonary/Chest: Effort normal and breath sounds normal. No respiratory distress. She has no wheezes. She has no rales.   Abdominal: Soft. Bowel sounds are normal. She exhibits no mass. There is no tenderness.   Musculoskeletal: Normal range of motion. She exhibits no edema.   Lymphadenopathy:     She has no cervical adenopathy.   Neurological: She is alert and oriented to person, place, and time.   Skin: Skin is warm and " dry. No rash noted.   Psychiatric: She has a normal mood and affect.       Results Review:   I reviewed the patient's new clinical results.  I reviewed the patient's new imaging results and agree with the interpretation.  I reviewed the patient's other test results and agree with the interpretation  I personally viewed and interpreted the patient's EKG/Telemetry data      Assessment/Plan     Active Problems:    Chest pain at rest    1.  Non-ST elevation myocardial infarction: Patient is chest pain which is fairly typical for angina at rest.  She has undergone serial troponins were which were noted to be elevated initially and have increased rapidly.  Initial EKG showed anterior and inferior ST abnormalities that appears to have improved on subsequent EKGs.    2.  Hypertension: Patient with a history of hypertension who remains mildly hypertensive on current regimen.    3.  Dyslipidemia: Patient with history of dyslipidemia currently on no medication.  A lipid panel done today showed elevated LDL.    4.  Esophageal reflux disease: Patient with history of esophageal reflux disease currently on pantoprazole.    Plan:    1.  Non-ST elevation infarction: This point will continue on heparin, Plavix, beta blocker, ACE inhibitor and statin.  Will order serial EKGs.  We will check an echocardiogram.  We'll continue to monitor troponin trend.  At this point the patient remains chest pain-free.    2.  Hypertension: At this point will start the patient on Coreg and monitor for improvement.  Will adjust the dose as necessary.    3.  Dyslipidemia: At this point will start the patient on high-dose statin.  We will need to restart facial as an outpatient.    I discussed the patients findings and my recommendations with patient and nursing staff    Stew Guy MD  08/13/17  10:58 PM

## 2017-08-14 NOTE — NURSING NOTE
"  ACC REVIEW REPORT: [facility]        PATIENT NAME: Liliya Granado    PATIENT ID: [id number]    BED:    BED TYPE: TO BE ADMITTED TO [facility] ON 8/15/17    BED GIVEN TO:     TIME BED GIVEN:     YOB: 1950    AGE: 66    GENDER: FEMALE    PREVIOUS ADMIT TO [facility]:     PREVIOUS ADMISSION DATE:     PATIENT CLASS:    TODAY'S DATE: 8/14/2017    TRANSFER DATE: AM ADMIT TO [facility] ON 8/15/17    ETA:     TRANSFERRING FACILITY: [facility]    TRANSFERRING FACILITY PHONE # : [phone]    TRANSFERRING MD:     DATE/TIME REQUEST RECEIVED: 8/14/17 @ 9737    [facility] RN: JOSLYN ELIZABETH RN    REPORT FROM: THELMA ENGLISH RN    TIME REPORT TAKEN: 8/14/17 @ 1600, UPDATE WILL BE OBTAINED PRIOR TO TRANSFER IN THE AM    DIAGNOSIS: ELEVATED TROPONIN    REASON FOR TRANSFER TO [facility]: HIGHER LEVEL OF CARE    TRANSPORTATION: LOCAL EMS    CLINICAL REASON FOR TRANSFER TO [facility]: PATIENT WAS ADMITTED TO [facility] ON 8/13/17 FOR CHEST PAIN. EKG SHOWED ST DEPRESSION, TROPONIN ON ADMIT WAS 0.266  PATIENT HAS SOME EPISODES OF CHEST PRESSURE THROUGHOUT THE NIGHT BUT IS PAIN FREE AT TIME OF REPORT.  PT WILL BE TRANSFERRED TO [facility] IN THE AM ON 8/15/17        CLINICAL INFORMATION    HEIGHT: 62\"    WEIGHT: 129 LBS    ALLERGIES: CODEINE    MARIN: NO    INFECTIOUS DISEASE: NONE REPORTED    ISOLATION:       LAST VITAL SIGNS:  PULSE: 65 SINUS RHYTHM WITH ST DEPRESSION  B/P:123/94  RESP: 14    LAB INFORMATION: BUN 14  CREATINE 0.72  12.9/40.3 H &H      MEDS/IV FLUIDS:   # 18 LAC...SALINE LOCKED      CARDIAC SYSTEM:    CHEST PAIN: PAIN FREE AT TIME OF REPORT      RHYTHM:SR WITH ST DEPRESSION  Is patient taking or has patient been given any drugs that could increase bleeding? Plavix, ASPIRIN      CARDIAC ENZYMES:    DATE: 8/13/17  TIME: ADMIT  TROP: 0.266    DATE: 8/14/17  TIME: 0340  TROP 6.012    8/14/17  TIME: 1136  TROPONIN 5.153    HEART CATH: NO    ECHO DONE 8/14/17      RESPIRATORY SYSTEM:    LUNG SOUNDS:    CLEAR:         CNS/MUSCULOSKELETAL    ALERT AND " ORIENTED: YES        PAST MEDICAL HISTORY: HTN, GERD    OTHER SYMPTOM NOTES:     ADDITIONAL NOTES:        Emelia Nunez RN  8/14/2017  4:10 PM

## 2017-08-14 NOTE — NURSING NOTE
Report provided to Yaima MURRY at [facility]. Pt to transfer in AM for cardiac catherization. Requests pt not be transported until 0800.

## 2017-08-14 NOTE — PLAN OF CARE
Problem: Patient Care Overview (Adult)  Goal: Plan of Care Review  Outcome: Ongoing (interventions implemented as appropriate)    08/14/17 0000   Coping/Psychosocial Response Interventions   Plan Of Care Reviewed With patient   Patient Care Overview   Progress improving       Goal: Discharge Needs Assessment  Outcome: Ongoing (interventions implemented as appropriate)    08/14/17 0000   Discharge Needs Assessment   Concerns To Be Addressed no discharge needs identified   Readmission Within The Last 30 Days no previous admission in last 30 days   Equipment Needed After Discharge none   Current Health   Anticipated Changes Related to Illness none   Self-Care   Equipment Currently Used at Home none   Living Environment   Transportation Available car         Problem: Pain, Acute (Adult)  Goal: Identify Related Risk Factors and Signs and Symptoms  Outcome: Ongoing (interventions implemented as appropriate)    08/14/17 0000   Pain, Acute   Related Risk Factors (Acute Pain) persistent pain   Signs and Symptoms (Acute Pain) verbalization of pain descriptors       Goal: Acceptable Pain Control/Comfort Level  Outcome: Ongoing (interventions implemented as appropriate)    Problem: Cardiac Output, Decreased (Adult)  Goal: Identify Related Risk Factors and Signs and Symptoms  Outcome: Ongoing (interventions implemented as appropriate)    08/14/17 0000   Cardiac Output, Decreased   Signs and Symptoms (Cardiac Output Decreased) angina;edema;fatigue;heart rate/rhythm alteration;diaphoresis       Goal: Adequate Cardiac Output/Effective Tissue Perfusion  Outcome: Ongoing (interventions implemented as appropriate)

## 2017-08-14 NOTE — H&P
Patient Identification:  Name:  Liliya Granado  Age:  66 y.o.  Sex:  female  :  1950  MRN:  [id number]   Visit Number:  [id number]  Primary Care Physician:  Steve Martinez MD       Chief complaint: Chest pain    History of presenting illness:  66 y.o. female presented with substernal chest pressure the beginning this morning at [employer].  It continued throughout the day off and on [facility] emergency room.-She turned to have positive enzymes and was admitted with a non-ST MI.  She has no previous heart disease though it does run in the family  ---------------------------------------------------------------------------------------------------------------------   Review Of Systems:  See HPI  ---------------------------------------------------------------------------------------------------------------------   Past Medical History:   Diagnosis Date   • GERD (gastroesophageal reflux disease)    • Hypertension      Past Surgical History:   Procedure Laterality Date   • HYSTERECTOMY       History reviewed. No pertinent family history.  Social History     Social History   • Marital status:      Spouse name: N/A   • Number of children: N/A   • Years of education: N/A     Social History Main Topics   • Smoking status: Never Smoker   • Smokeless tobacco: None   • Alcohol use No   • Drug use: No   • Sexual activity: Not Asked     Other Topics Concern   • None     Social History Narrative   • None     ---------------------------------------------------------------------------------------------------------------------   Allergies:  Codeine  ---------------------------------------------------------------------------------------------------------------------   Prior to Admission Medications     Prescriptions Last Dose Informant Patient Reported? Taking?    aspirin 81 MG EC tablet 2017 Self Yes Yes    Take 81 mg by mouth Daily.    lisinopril (PRINIVIL,ZESTRIL) 40 MG tablet 2017 Self Yes Yes    Take  40 mg by mouth Daily.    Omega-3 Fatty Acids (FISH OIL) 1000 MG capsule capsule 8/13/2017 Self Yes Yes    Take 1,000 mg by mouth Daily With Breakfast.    pantoprazole (PROTONIX) 40 MG EC tablet 8/13/2017 Self Yes Yes    Take 40 mg by mouth Daily.        Hospital Scheduled Meds:    aspirin 81 mg Oral Daily   atorvastatin 80 mg Oral Nightly   carvedilol 3.125 mg Oral Q12H   heparin (porcine) 60 Units/kg Intravenous Once   heparin (porcine) 5,000 Units Subcutaneous Q8H   lisinopril 40 mg Oral Daily   pantoprazole 40 mg Oral Q AM   Pharmacy Meds to Bed Consult  Does not apply Daily       heparin (porcine) 12 Units/kg/hr Last Rate: 14 Units/kg/hr (08/14/17 0504)     ---------------------------------------------------------------------------------------------------------------------   Vital Signs:  Temp:  [98.1 °F (36.7 °C)-98.5 °F (36.9 °C)] 98.1 °F (36.7 °C)  Heart Rate:  [60-91] 70  Resp:  [18-20] 18  BP: (133-169)/(67-89) 150/79  Last 3 weights    08/13/17  2030 08/13/17  2244 08/14/17  0600   Weight: 133 lb 12.8 oz (60.7 kg) 130 lb 8 oz (59.2 kg) 129 lb 4.8 oz (58.7 kg)     Body mass index is 23.65 kg/(m^2).  ---------------------------------------------------------------------------------------------------------------------   Physical Exam:  Constitutional: Patient alert distress no chest pain    HENT:  Neck supple   Eyes:  No jaundice  Neck:      Cardiovascular:  S1 and S2  Pulmonary/Chest:  Clear  Abdominal:  Soft   Musculoskeletal:      Neurological:   Skin:    Psychiatric:      ---------------------------------------------------------------------------------------------------------------------      ---------------------------------------------------------------------------------------------------------------------     Results from last 7 days  Lab Units 08/13/17  2221 08/13/17  1902   WBC 10*3/mm3 5.48 4.34*   HEMOGLOBIN g/dL 12.9 12.9   HEMATOCRIT % 40.3 39.7   MCV fL 87.4 87.1   MCHC g/dL 32.0* 32.5*    PLATELETS 10*3/mm3 157 160   INR  0.97 0.96           Results from last 7 days  Lab Units 08/13/17 1902   SODIUM mmol/L 141   POTASSIUM mmol/L 3.9   CHLORIDE mmol/L 106   CO2 mmol/L 27.5   BUN mg/dL 14   CREATININE mg/dL 0.72   EGFR IF NONAFRICN AM mL/min/1.73 81   CALCIUM mg/dL 9.5   GLUCOSE mg/dL 183*   ALBUMIN g/dL 4.50   BILIRUBIN mg/dL 0.8   ALK PHOS U/L 90   AST (SGOT) U/L 23   ALT (SGPT) U/L 25   Estimated Creatinine Clearance: 63.2 mL/min (by C-G formula based on Cr of 0.72).  No results found for: AMMONIA    Results from last 7 days  Lab Units 08/14/17  0340 08/13/17 2221 08/13/17 2053 08/13/17 1902   CK TOTAL U/L  --   --  138 130   TROPONIN I ng/mL 6.012* 4.921* 2.977* 0.266*   CK MB INDEX %  --   --  8.2* 4.5*         No results found for: HGBA1C  No results found for: TSH, FREET4  No results found for: PREGTESTUR, PREGSERUM, HCG, HCGQUANT  Pain Management Panel     There is no flowsheet data to display.                      Results from last 7 days  Lab Units 08/13/17 2053   CHOLESTEROL mg/dL 242*   TRIGLYCERIDES mg/dL 166*   HDL CHOL mg/dL 42*     ---------------------------------------------------------------------------------------------------------------------  Imaging Results (last 7 days)     Procedure Component Value Units Date/Time    XR Chest 1 View [[id number]] Resulted:  08/13/17 1933     Updated:  08/13/17 1933          ---------------------------------------------------------------------------------------------------------------------  Assessment and Plan:  1. Acute non-ST MI in patient with history of hypertension hyperlipidemia   we'll plan to check echo and at some point patient will probably require a catheterization  2.   3.   4.   5.     Steve Martinez MD  08/14/17  6:46 AM

## 2017-08-14 NOTE — PROGRESS NOTES
Discharge Planning Assessment   Jose Elias     Patient Name: Liliya Granado  MRN: [id number]  Today's Date: 8/14/2017    Admit Date: 8/13/2017          Discharge Needs Assessment       08/14/17 1235    Living Environment    Lives With spouse    Living Arrangements mobile home    Home Accessibility no concerns    Transportation Available car;family or friend will provide   Patients  will provide transportation on discharge.     Living Environment    Quality Of Family Relationships supportive;helpful;involved   Patient has several family members at the bedside.    Able to Return to Prior Living Arrangements yes    Discharge Needs Assessment    Concerns To Be Addressed no discharge needs identified    Readmission Within The Last 30 Days no previous admission in last 30 days    Equipment Currently Used at Home none    Equipment Needed After Discharge none            Discharge Plan       08/14/17 1238    Case Management/Social Work Plan    Plan Patient lives at home with her , Samuel. She plans to return home at discharge. Patient is independent and does not utilize HH or DME. No needs identified. CM will continue to follow.    Patient/Family In Agreement With Plan yes    Additional Comments Patient came to ER with c/o chest pain that started in the AM during [employer]. The pain continued off and on throughout the day. Admit patient to PCU, ECHO ordered, cardiology consulted, trend cardiac enzymes, start Hep gtt. Plan for cardiac Cath over the next few days.        Discharge Placement     No information found                Demographic Summary       08/14/17 1232    Referral Information    Admission Type inpatient    Arrived From home or self-care    Referral Source admission list    Reason For Consult discharge planning    Record Reviewed medical record    Contact Information    Permission Granted to Share Information With     Primary Care Physician Information    Name Patient's PCP is Dr Martinez.             Functional Status       08/14/17 1233    Functional Status Current    Current Functional Level Comment Patient is independent with ADL's.    Functional Status Prior    Prior Functional Level Comment Patient is independent with ADL's.    Activity Tolerance    Current Activity Limitations none    Usual Activity Tolerance good    Current Activity Tolerance moderate    Cognitive/Perceptual/Developmental    Current Mental Status/Cognitive Functioning no deficits noted    Recent Changes in Mental Status/Cognitive Functioning no changes    Employment/Financial    Employment/Finance Comments Patient has Medicare and [place] [employer] PPO. She states that she does not have issues obtaining her prescriptions.       Legal       08/14/17 1237    Legal    Legal Comments Patient does not have POA/AD and does not wish to receive information at this time.             Sunita Rabago RN

## 2017-08-15 ENCOUNTER — HOSPITAL ENCOUNTER (INPATIENT)
Facility: HOSPITAL | Age: 67
LOS: 2 days | Discharge: HOME | End: 2017-08-17
Attending: INTERNAL MEDICINE | Admitting: INTERNAL MEDICINE

## 2017-08-15 VITALS
SYSTOLIC BLOOD PRESSURE: 137 MMHG | DIASTOLIC BLOOD PRESSURE: 75 MMHG | RESPIRATION RATE: 18 BRPM | HEART RATE: 58 BPM | BODY MASS INDEX: 24.46 KG/M2 | HEIGHT: 62 IN | OXYGEN SATURATION: 98 % | WEIGHT: 132.9 LBS | TEMPERATURE: 98.2 F

## 2017-08-15 DIAGNOSIS — I21.4 NSTEMI (NON-ST ELEVATED MYOCARDIAL INFARCTION) (HCC): Primary | ICD-10-CM

## 2017-08-15 LAB — HBA1C MFR BLD: 5.8 % (ref 4.8–5.6)

## 2017-08-15 PROCEDURE — 92978 ENDOLUMINL IVUS OCT C 1ST: CPT | Performed by: INTERNAL MEDICINE

## 2017-08-15 PROCEDURE — 36415 COLL VENOUS BLD VENIPUNCTURE: CPT

## 2017-08-15 PROCEDURE — 25010000002 FENTANYL CITRATE (PF) 100 MCG/2ML SOLUTION: Performed by: INTERNAL MEDICINE

## 2017-08-15 PROCEDURE — B2151ZZ FLUOROSCOPY OF LEFT HEART USING LOW OSMOLAR CONTRAST: ICD-10-PCS | Performed by: INTERNAL MEDICINE

## 2017-08-15 PROCEDURE — A9270 NON-COVERED ITEM OR SERVICE: HCPCS | Performed by: PHYSICIAN ASSISTANT

## 2017-08-15 PROCEDURE — C1874 STENT, COATED/COV W/DEL SYS: HCPCS | Performed by: INTERNAL MEDICINE

## 2017-08-15 PROCEDURE — C1894 INTRO/SHEATH, NON-LASER: HCPCS | Performed by: INTERNAL MEDICINE

## 2017-08-15 PROCEDURE — A9270 NON-COVERED ITEM OR SERVICE: HCPCS | Performed by: INTERNAL MEDICINE

## 2017-08-15 PROCEDURE — B2111ZZ FLUOROSCOPY OF MULTIPLE CORONARY ARTERIES USING LOW OSMOLAR CONTRAST: ICD-10-PCS | Performed by: INTERNAL MEDICINE

## 2017-08-15 PROCEDURE — 63710000001 CLOPIDOGREL 75 MG TABLET: Performed by: INTERNAL MEDICINE

## 2017-08-15 PROCEDURE — 93005 ELECTROCARDIOGRAM TRACING: CPT | Performed by: INTERNAL MEDICINE

## 2017-08-15 PROCEDURE — C1725 CATH, TRANSLUMIN NON-LASER: HCPCS | Performed by: INTERNAL MEDICINE

## 2017-08-15 PROCEDURE — C1760 CLOSURE DEV, VASC: HCPCS | Performed by: INTERNAL MEDICINE

## 2017-08-15 PROCEDURE — 63710000001 PANTOPRAZOLE 40 MG TABLET DELAYED-RELEASE: Performed by: PHYSICIAN ASSISTANT

## 2017-08-15 PROCEDURE — C1769 GUIDE WIRE: HCPCS | Performed by: INTERNAL MEDICINE

## 2017-08-15 PROCEDURE — C1887 CATHETER, GUIDING: HCPCS | Performed by: INTERNAL MEDICINE

## 2017-08-15 PROCEDURE — 25010000002 MIDAZOLAM PER 1 MG: Performed by: INTERNAL MEDICINE

## 2017-08-15 PROCEDURE — C1753 CATH, INTRAVAS ULTRASOUND: HCPCS | Performed by: INTERNAL MEDICINE

## 2017-08-15 PROCEDURE — 027035Z DILATION OF CORONARY ARTERY, ONE ARTERY WITH TWO DRUG-ELUTING INTRALUMINAL DEVICES, PERCUTANEOUS APPROACH: ICD-10-PCS | Performed by: INTERNAL MEDICINE

## 2017-08-15 PROCEDURE — C9600 PERC DRUG-EL COR STENT SING: HCPCS | Performed by: INTERNAL MEDICINE

## 2017-08-15 PROCEDURE — 83036 HEMOGLOBIN GLYCOSYLATED A1C: CPT | Performed by: PHYSICIAN ASSISTANT

## 2017-08-15 PROCEDURE — 63710000001 ASPIRIN 81 MG TABLET DELAYED-RELEASE: Performed by: INTERNAL MEDICINE

## 2017-08-15 PROCEDURE — 0 IOPAMIDOL PER 1 ML: Performed by: INTERNAL MEDICINE

## 2017-08-15 PROCEDURE — 93005 ELECTROCARDIOGRAM TRACING: CPT | Performed by: PHYSICIAN ASSISTANT

## 2017-08-15 PROCEDURE — 93458 L HRT ARTERY/VENTRICLE ANGIO: CPT | Performed by: INTERNAL MEDICINE

## 2017-08-15 PROCEDURE — S0260 H&P FOR SURGERY: HCPCS | Performed by: PHYSICIAN ASSISTANT

## 2017-08-15 PROCEDURE — 92928 PRQ TCAT PLMT NTRAC ST 1 LES: CPT | Performed by: INTERNAL MEDICINE

## 2017-08-15 PROCEDURE — 25010000002 ONDANSETRON PER 1 MG: Performed by: INTERNAL MEDICINE

## 2017-08-15 PROCEDURE — 25010000002 BIVALIRUDIN PER 1 MG: Performed by: INTERNAL MEDICINE

## 2017-08-15 PROCEDURE — 4A023N7 MEASUREMENT OF CARDIAC SAMPLING AND PRESSURE, LEFT HEART, PERCUTANEOUS APPROACH: ICD-10-PCS | Performed by: INTERNAL MEDICINE

## 2017-08-15 DEVICE — XIENCE ALPINE EVEROLIMUS ELUTING CORONARY STENT SYSTEM 2.50 MM X 08 MM / RAPID-EXCHANGE
Type: IMPLANTABLE DEVICE | Status: FUNCTIONAL
Brand: XIENCE ALPINE

## 2017-08-15 DEVICE — XIENCE ALPINE EVEROLIMUS ELUTING CORONARY STENT SYSTEM 2.75 MM X 38 MM / RAPID-EXCHANGE
Type: IMPLANTABLE DEVICE | Status: FUNCTIONAL
Brand: XIENCE ALPINE

## 2017-08-15 RX ORDER — ONDANSETRON 4 MG/1
4 TABLET, FILM COATED ORAL EVERY 6 HOURS PRN
Status: DISCONTINUED | OUTPATIENT
Start: 2017-08-15 | End: 2017-08-17 | Stop reason: HOSPADM

## 2017-08-15 RX ORDER — ATORVASTATIN CALCIUM 80 MG/1
80 TABLET, FILM COATED ORAL NIGHTLY
Qty: 30 TABLET | Refills: 0 | Status: ON HOLD | OUTPATIENT
Start: 2017-08-15 | End: 2017-08-16 | Stop reason: CLARIF

## 2017-08-15 RX ORDER — CARVEDILOL 3.12 MG/1
3.12 TABLET ORAL EVERY 12 HOURS SCHEDULED
Qty: 60 TABLET | Refills: 0 | Status: ON HOLD | OUTPATIENT
Start: 2017-08-15 | End: 2017-08-16 | Stop reason: CLARIF

## 2017-08-15 RX ORDER — CLOPIDOGREL BISULFATE 75 MG/1
75 TABLET ORAL ONCE
Status: COMPLETED | OUTPATIENT
Start: 2017-08-15 | End: 2017-08-15

## 2017-08-15 RX ORDER — NITROGLYCERIN 5 MG/ML
INJECTION, SOLUTION INTRAVENOUS AS NEEDED
Status: DISCONTINUED | OUTPATIENT
Start: 2017-08-15 | End: 2017-08-15 | Stop reason: HOSPADM

## 2017-08-15 RX ORDER — LISINOPRIL 20 MG/1
20 TABLET ORAL DAILY
Status: DISCONTINUED | OUTPATIENT
Start: 2017-08-16 | End: 2017-08-17

## 2017-08-15 RX ORDER — CARVEDILOL 6.25 MG/1
6.25 TABLET ORAL EVERY 12 HOURS SCHEDULED
Status: DISCONTINUED | OUTPATIENT
Start: 2017-08-15 | End: 2017-08-16

## 2017-08-15 RX ORDER — SODIUM CHLORIDE 0.9 % (FLUSH) 0.9 %
1-10 SYRINGE (ML) INJECTION AS NEEDED
Status: DISCONTINUED | OUTPATIENT
Start: 2017-08-15 | End: 2017-08-15 | Stop reason: HOSPADM

## 2017-08-15 RX ORDER — LIDOCAINE HYDROCHLORIDE 10 MG/ML
INJECTION, SOLUTION INFILTRATION; PERINEURAL AS NEEDED
Status: DISCONTINUED | OUTPATIENT
Start: 2017-08-15 | End: 2017-08-15 | Stop reason: HOSPADM

## 2017-08-15 RX ORDER — CLOPIDOGREL BISULFATE 75 MG/1
75 TABLET ORAL DAILY
Qty: 30 TABLET | Refills: 0 | Status: ON HOLD | OUTPATIENT
Start: 2017-08-15 | End: 2017-08-16 | Stop reason: CLARIF

## 2017-08-15 RX ORDER — ASPIRIN 81 MG/1
81 TABLET ORAL DAILY
Status: DISCONTINUED | OUTPATIENT
Start: 2017-08-16 | End: 2017-08-17 | Stop reason: HOSPADM

## 2017-08-15 RX ORDER — ATORVASTATIN CALCIUM 40 MG/1
80 TABLET, FILM COATED ORAL NIGHTLY
Status: DISCONTINUED | OUTPATIENT
Start: 2017-08-15 | End: 2017-08-15

## 2017-08-15 RX ORDER — CARVEDILOL 3.12 MG/1
3.12 TABLET ORAL EVERY 12 HOURS SCHEDULED
Status: DISCONTINUED | OUTPATIENT
Start: 2017-08-15 | End: 2017-08-15

## 2017-08-15 RX ORDER — MIDAZOLAM HYDROCHLORIDE 1 MG/ML
INJECTION INTRAMUSCULAR; INTRAVENOUS AS NEEDED
Status: DISCONTINUED | OUTPATIENT
Start: 2017-08-15 | End: 2017-08-15 | Stop reason: HOSPADM

## 2017-08-15 RX ORDER — FENTANYL CITRATE 50 UG/ML
INJECTION, SOLUTION INTRAMUSCULAR; INTRAVENOUS AS NEEDED
Status: DISCONTINUED | OUTPATIENT
Start: 2017-08-15 | End: 2017-08-15 | Stop reason: HOSPADM

## 2017-08-15 RX ORDER — LISINOPRIL 40 MG/1
40 TABLET ORAL DAILY
Status: DISCONTINUED | OUTPATIENT
Start: 2017-08-15 | End: 2017-08-15

## 2017-08-15 RX ORDER — FENTANYL CITRATE 50 UG/ML
12.5 INJECTION, SOLUTION INTRAMUSCULAR; INTRAVENOUS
Status: DISCONTINUED | OUTPATIENT
Start: 2017-08-15 | End: 2017-08-16

## 2017-08-15 RX ORDER — CLOPIDOGREL BISULFATE 75 MG/1
75 TABLET ORAL DAILY
Status: DISCONTINUED | OUTPATIENT
Start: 2017-08-16 | End: 2017-08-15

## 2017-08-15 RX ORDER — PANTOPRAZOLE SODIUM 40 MG/1
40 TABLET, DELAYED RELEASE ORAL DAILY
Status: DISCONTINUED | OUTPATIENT
Start: 2017-08-15 | End: 2017-08-17 | Stop reason: HOSPADM

## 2017-08-15 RX ORDER — ONDANSETRON 2 MG/ML
4 INJECTION INTRAMUSCULAR; INTRAVENOUS EVERY 6 HOURS PRN
Status: DISCONTINUED | OUTPATIENT
Start: 2017-08-15 | End: 2017-08-17 | Stop reason: HOSPADM

## 2017-08-15 RX ORDER — NITROGLYCERIN 0.4 MG/1
0.4 TABLET SUBLINGUAL
Status: DISCONTINUED | OUTPATIENT
Start: 2017-08-15 | End: 2017-08-17 | Stop reason: HOSPADM

## 2017-08-15 RX ORDER — ASPIRIN 81 MG/1
81 TABLET ORAL ONCE
Status: COMPLETED | OUTPATIENT
Start: 2017-08-15 | End: 2017-08-15

## 2017-08-15 RX ORDER — ONDANSETRON 2 MG/ML
INJECTION INTRAMUSCULAR; INTRAVENOUS AS NEEDED
Status: DISCONTINUED | OUTPATIENT
Start: 2017-08-15 | End: 2017-08-15 | Stop reason: HOSPADM

## 2017-08-15 RX ORDER — ACETAMINOPHEN 325 MG/1
650 TABLET ORAL EVERY 4 HOURS PRN
Status: DISCONTINUED | OUTPATIENT
Start: 2017-08-15 | End: 2017-08-17 | Stop reason: HOSPADM

## 2017-08-15 RX ADMIN — CARVEDILOL 6.25 MG: 6.25 TABLET, FILM COATED ORAL at 20:29

## 2017-08-15 RX ADMIN — ONDANSETRON 4 MG: 2 INJECTION INTRAMUSCULAR; INTRAVENOUS at 23:19

## 2017-08-15 RX ADMIN — FENTANYL CITRATE 12.5 MCG: 50 INJECTION, SOLUTION INTRAMUSCULAR; INTRAVENOUS at 15:04

## 2017-08-15 RX ADMIN — ACETAMINOPHEN 650 MG: 325 TABLET, FILM COATED ORAL at 20:29

## 2017-08-15 RX ADMIN — ASPIRIN 81 MG: 81 TABLET, COATED ORAL at 12:30

## 2017-08-15 RX ADMIN — CLOPIDOGREL BISULFATE 75 MG: 75 TABLET ORAL at 12:30

## 2017-08-15 RX ADMIN — ONDANSETRON 4 MG: 2 INJECTION INTRAMUSCULAR; INTRAVENOUS at 14:58

## 2017-08-15 RX ADMIN — NITROGLYCERIN 1 INCH: 20 OINTMENT TOPICAL at 14:51

## 2017-08-15 RX ADMIN — PANTOPRAZOLE SODIUM 40 MG: 40 TABLET, DELAYED RELEASE ORAL at 12:30

## 2017-08-15 NOTE — PLAN OF CARE
Problem: Cardiac Catheterization with/without PCI (Adult)  Goal: Signs and Symptoms of Listed Potential Problems Will be Absent or Manageable (Cardiac Catheterization with/without PCI)  Outcome: Ongoing (interventions implemented as appropriate)    08/15/17 1143   Cardiac Catheterization with/without PCI   Problems Assessed (Cardiac Catheterization) all   Problems Present (Cardiac Catheterization) none

## 2017-08-15 NOTE — PROGRESS NOTES
Liliya Granado 66 y.o.   08/15/17    Subjective: Patient resting comfortably and has no complaints of chest pain  Objective: L/stable no change physical exam  Vital Signs (last 72 hrs)       08/11 0700  -  08/12 0659 08/12 0700  -  08/13 0659 08/13 0700  -  08/14 0659 08/14 0700  -  08/15 0641   Most Recent    Temp (°F)     98.1 -  98.5    97.7 -  98.5     97.9 (36.6)    Heart Rate     60 -  91    53 -  74     61    Resp     18 -  20      18     18    BP     133/67 -  169/80    (!)88/61 -  149/84     123/82    SpO2 (%)     96 -  100    92 -  100     98        Lab Results (last 72 hours)     Procedure Component Value Units Date/Time    CBC & Differential [[id number]] Collected:  08/13/17 1902    Specimen:  Blood Updated:  08/13/17 1909    Narrative:       The following orders were created for panel order CBC & Differential.  Procedure                               Abnormality         Status                     ---------                               -----------         ------                     CBC Auto Differential[[id number]]         Abnormal            Final result                 Please view results for these tests on the individual orders.    CBC Auto Differential [[id number]]  (Abnormal) Collected:  08/13/17 1902    Specimen:  Blood Updated:  08/13/17 1909     WBC 4.34 (L) 10*3/mm3      RBC 4.56 10*6/mm3      Hemoglobin 12.9 g/dL      Hematocrit 39.7 %      MCV 87.1 fL      MCH 28.3 pg      MCHC 32.5 (L) g/dL      RDW 14.2 %      RDW-SD 44.8 fl      MPV 11.0 (H) fL      Platelets 160 10*3/mm3      Neutrophil % 65.9 %      Lymphocyte % 25.3 %      Monocyte % 6.5 %      Eosinophil % 2.1 %      Basophil % 0.2 %      Immature Grans % 0.0 %      Neutrophils, Absolute 2.86 10*3/mm3      Lymphocytes, Absolute 1.10 10*3/mm3      Monocytes, Absolute 0.28 10*3/mm3      Eosinophils, Absolute 0.09 10*3/mm3      Basophils, Absolute 0.01 10*3/mm3      Immature Grans, Absolute 0.00 10*3/mm3     Protime-INR [[id number]]  (Normal)  Collected:  08/13/17 1902    Specimen:  Blood Updated:  08/13/17 1920     Protime 12.9 Seconds       Note new Reference Range        INR 0.96    Narrative:       Suggested INR therapeutic range for stable oral anticoagulant therapy:    Low Intensity therapy:   1.5-2.0  Moderate Intensity therapy:   2.0-3.0  High Intensity therapy:   2.5-4.0    Comprehensive Metabolic Panel [[id number]]  (Abnormal) Collected:  08/13/17 1902    Specimen:  Blood Updated:  08/13/17 1931     Glucose 183 (H) mg/dL      BUN 14 mg/dL      Creatinine 0.72 mg/dL      Sodium 141 mmol/L      Potassium 3.9 mmol/L      Chloride 106 mmol/L      CO2 27.5 mmol/L      Calcium 9.5 mg/dL      Total Protein 6.9 g/dL      Albumin 4.50 g/dL      ALT (SGPT) 25 U/L      AST (SGOT) 23 U/L      Alkaline Phosphatase 90 U/L       Note New Reference Ranges        Total Bilirubin 0.8 mg/dL      eGFR Non African Amer 81 mL/min/1.73      Globulin 2.4 gm/dL      A/G Ratio 1.9 g/dL      BUN/Creatinine Ratio 19.4     Anion Gap 7.5 mmol/L     Osmolality, Calculated [[id number]]  (Normal) Collected:  08/13/17 1902    Specimen:  Blood Updated:  08/13/17 1931     Osmolality Calc 286.4 mOsm/kg     Troponin [[id number]]  (Abnormal) Collected:  08/13/17 1902    Specimen:  Blood Updated:  08/13/17 1939     Troponin I 0.266 (H) ng/mL     Narrative:       Ultra Troponin I Reference Range:         <=0.039 ng/mL: Negative    0.04-0.779 ng/mL: Indeterminate Range. Suspicious of MI.  Clinical correlation required.       >=0.78  ng/mL: Consistent with myocardial injury.  Clinical correlation required.    CK [[id number]]  (Normal) Collected:  08/13/17 1902    Specimen:  Blood Updated:  08/13/17 2042     Creatine Kinase 130 U/L     CK-MB [[id number]]  (Abnormal) Collected:  08/13/17 1902    Specimen:  Blood Updated:  08/13/17 2042     CKMB 5.90 (C) ng/mL     CK-MB Index [[id number]]  (Abnormal) Collected:  08/13/17 1902    Specimen:  Blood Updated:  08/13/17 2100     CK-MB Index 4.5 (H) %      Mineral Draw [[id number]] Collected:  08/13/17 1902    Specimen:  Blood Updated:  08/13/17 2101    Narrative:       The following orders were created for panel order Mineral Draw.  Procedure                               Abnormality         Status                     ---------                               -----------         ------                     Light Blue Top[[id number]]                                    Final result               Green Top (Gel)[[id number]]                                   Final result               Lavender Top[[id number]]                                      Final result               Gold Top - SST[[id number]]                                    Final result                 Please view results for these tests on the individual orders.    Light Blue Top [[id number]] Collected:  08/13/17 1902    Specimen:  Blood Updated:  08/13/17 2101     Extra Tube hold for add-on      Auto resulted       Green Top (Gel) [[id number]] Collected:  08/13/17 1902    Specimen:  Blood Updated:  08/13/17 2101     Extra Tube Hold for add-ons.      Auto resulted.       Lavender Top [[id number]] Collected:  08/13/17 1902    Specimen:  Blood Updated:  08/13/17 2101     Extra Tube hold for add-on      Auto resulted       Gold Top - SST [[id number]] Collected:  08/13/17 1902    Specimen:  Blood Updated:  08/13/17 2101     Extra Tube Hold for add-ons.      Auto resulted.       Troponin [[id number]]  (Abnormal) Collected:  08/13/17 2053    Specimen:  Blood Updated:  08/13/17 2157     Troponin I 2.977 (C) ng/mL     Narrative:       Ultra Troponin I Reference Range:         <=0.039 ng/mL: Negative    0.04-0.779 ng/mL: Indeterminate Range. Suspicious of MI.  Clinical correlation required.       >=0.78  ng/mL: Consistent with myocardial injury.  Clinical correlation required.    CK [[id number]]  (Normal) Collected:  08/13/17 2053    Specimen:  Blood Updated:  08/13/17 2158     Creatine Kinase 138 U/L     CK-MB [[id number]]  (Abnormal)  Collected:  08/13/17 2053    Specimen:  Blood Updated:  08/13/17 2158     CKMB 11.35 (C) ng/mL     CK-MB Index [[id number]]  (Abnormal) Collected:  08/13/17 2053    Specimen:  Blood Updated:  08/13/17 2158     CK-MB Index 8.2 (H) %     Lipid Panel [[id number]]  (Abnormal) Collected:  08/13/17 2053    Specimen:  Blood Updated:  08/13/17 2210     Total Cholesterol 242 (H) mg/dL      Triglycerides 166 (H) mg/dL      HDL Cholesterol 42 (L) mg/dL      LDL Cholesterol  167 (H) mg/dL      VLDL Cholesterol 33.2 mg/dL      LDL/HDL Ratio 3.97    Narrative:       Cholesterol Reference Ranges  (U.S. Department of Health and Human Services ATP III Classifications)    Desirable          <200 mg/dL  Borderline High    200-239 mg/dL  High Risk          >240 mg/dL      Triglyceride Reference Ranges  (U.S. Department of Health and Human Services ATP III Classifications)    Normal           <150 mg/dL  Borderline High  150-199 mg/dL  High             200-499 mg/dL  Very High        >500 mg/dL    HDL Reference Ranges  (U.S. Department of Health and Human Services ATP III Classifcations)    Low     <40 mg/dl (major risk factor for CHD)  High    >60 mg/dl ('negative' risk factor for CHD)        LDL Reference Ranges  (U.S. Department of Health and Human Services ATP III Classifcations)    Optimal          <100 mg/dL  Near Optimal     100-129 mg/dL  Borderline High  130-159 mg/dL  High             160-189 mg/dL  Very High        >189 mg/dL    CBC & Differential [[id number]] Collected:  08/13/17 2221    Specimen:  Blood Updated:  08/13/17 2309    Narrative:       The following orders were created for panel order CBC & Differential.  Procedure                               Abnormality         Status                     ---------                               -----------         ------                     CBC Auto Differential[[id number]]        Abnormal            Final result                 Please view results for these tests on the individual  orders.    CBC Auto Differential [[id number]]  (Abnormal) Collected:  08/13/17 2221    Specimen:  Blood Updated:  08/13/17 2309     WBC 5.48 10*3/mm3      RBC 4.61 10*6/mm3      Hemoglobin 12.9 g/dL      Hematocrit 40.3 %      MCV 87.4 fL      MCH 28.0 pg      MCHC 32.0 (L) g/dL      RDW 14.3 %      RDW-SD 45.3 fl      MPV 11.3 (H) fL      Platelets 157 10*3/mm3      Neutrophil % 73.0 %      Lymphocyte % 20.8 %      Monocyte % 5.3 %      Eosinophil % 0.5 %      Basophil % 0.2 %      Immature Grans % 0.2 %      Neutrophils, Absolute 4.00 10*3/mm3      Lymphocytes, Absolute 1.14 10*3/mm3      Monocytes, Absolute 0.29 10*3/mm3      Eosinophils, Absolute 0.03 10*3/mm3      Basophils, Absolute 0.01 10*3/mm3      Immature Grans, Absolute 0.01 10*3/mm3     Protime-INR [[id number]]  (Normal) Collected:  08/13/17 2221    Specimen:  Blood Updated:  08/13/17 2321     Protime 13.0 Seconds       Note new Reference Range        INR 0.97    Narrative:       Suggested INR therapeutic range for stable oral anticoagulant therapy:    Low Intensity therapy:   1.5-2.0  Moderate Intensity therapy:   2.0-3.0  High Intensity therapy:   2.5-4.0    aPTT [[id number]]  (Normal) Collected:  08/13/17 2221    Specimen:  Blood Updated:  08/13/17 2321     PTT 29.9 seconds       Note new Reference Range       Narrative:       PTT Heparin Therapeutic Range:  59 - 95 seconds    Troponin [[id number]]  (Abnormal) Collected:  08/13/17 2221    Specimen:  Blood Updated:  08/13/17 2351     Troponin I 4.921 (C) ng/mL     Narrative:       Ultra Troponin I Reference Range:         <=0.039 ng/mL: Negative    0.04-0.779 ng/mL: Indeterminate Range. Suspicious of MI.  Clinical correlation required.       >=0.78  ng/mL: Consistent with myocardial injury.  Clinical correlation required.    Troponin [[id number]]  (Abnormal) Collected:  08/14/17 0340    Specimen:  Blood Updated:  08/14/17 0442     Troponin I 6.012 (C) ng/mL     Narrative:       Ultra Troponin I Reference  Range:         <=0.039 ng/mL: Negative    0.04-0.779 ng/mL: Indeterminate Range. Suspicious of MI.  Clinical correlation required.       >=0.78  ng/mL: Consistent with myocardial injury.  Clinical correlation required.    aPTT [[id number]]  (Abnormal) Collected:  08/14/17 0340    Specimen:  Blood Updated:  08/14/17 0443     PTT 54.1 (H) seconds       Note new Reference Range       Narrative:       PTT Heparin Therapeutic Range:  59 - 95 seconds    Troponin [[id number]]  (Abnormal) Collected:  08/14/17 1136    Specimen:  Blood Updated:  08/14/17 1233     Troponin I 5.153 (C) ng/mL     Narrative:       Ultra Troponin I Reference Range:         <=0.039 ng/mL: Negative    0.04-0.779 ng/mL: Indeterminate Range. Suspicious of MI.  Clinical correlation required.       >=0.78  ng/mL: Consistent with myocardial injury.  Clinical correlation required.        Imaging Results (last 24 hours)     Procedure Component Value Units Date/Time    XR Chest 1 View [[id number]] Collected:  08/14/17 0733     Updated:  08/14/17 0736    Narrative:       EXAMINATION: XR CHEST 1 VW-      CLINICAL INDICATION:     Chest pain protocol     TECHNIQUE:  XR CHEST 1 VW-      COMPARISON: NONE      FINDINGS:   Lungs are aerated.   Heart and mediastinal contours are unremarkable.   No pneumothorax.   No pleural effusion.   No acute osseous findings.            Impression:       No radiographic evidence of acute cardiac or pulmonary  disease.     This report was finalized on 8/14/2017 7:34 AM by Dr. Tres Weber MD.           Assessment:Active Problems:    Chest pain at rest   Non-ST elevation myocardial infarction  Plan: Plan for catheterization

## 2017-08-15 NOTE — H&P
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   female, housewife.    08/15/17    Chief Complaint: Chest pain.     Problem list/past medical history:  1.  NSTEMI   A.  Associated chest pain   B.  Elevated cardiac markers and abnl EKG   C.  Echo 8/14:  EF 45%, mild MR.  2.  Essential hypertension  3.  Mixed dyslipidemia  4.  Acid reflux  5.  Family history of CAD  6.  History of detached retina with multiple surgeries for repair    Allergies:  is allergic to codeine (rash).    Prescriptions Prior to Admission   Medication Sig Dispense Refill Last Dose   • aspirin 81 MG EC tablet Take 81 mg by mouth Daily.   8/13/2017 at AM   • atorvastatin (LIPITOR) 80 MG tablet Take 1 tablet by mouth Every Night. 30 tablet 0    • carvedilol (COREG) 3.125 MG tablet Take 1 tablet by mouth Every 12 (Twelve) Hours. 60 tablet 0    • clopidogrel (PLAVIX) 75 MG tablet Take 1 tablet by mouth Daily. 30 tablet 0    • lisinopril (PRINIVIL,ZESTRIL) 40 MG tablet Take 40 mg by mouth Daily.   8/13/2017 at AM   • Omega-3 Fatty Acids (FISH OIL) 1000 MG capsule capsule Take 1,000 mg by mouth Daily With Breakfast.   8/13/2017 at AM   • pantoprazole (PROTONIX) 40 MG EC tablet Take 40 mg by mouth Daily.   8/13/2017 at AM         No current facility-administered medications for this encounter.         CARDIAC RISK FACTORS:   advanced age (older than 55 for men, 65 for women), dyslipidemia, family history of premature cardiovascular disease and hypertension.     HPI:  Mrs. Granado is a 66-year-old  female with a noted above history.  She's had no prior cardiac workup or evaluation other than management of long-standing controlled hypertension.  She was doing well and in her normal state of health when she noticed chest pain in her left shoulder area on Sunday afternoon.  She does not recall any physical injury  to that area.  The pain is described as a pressure or a heavy rock in her mid sternal chest area.  It started in her left shoulder radiating down to mid sternal chest and across to her right shoulder.  It radiated up into the left side of her neck as well.  It lasted approximately 2-3 minutes before self resolution but presented once again.  After it waxed and waned within the first hour, she decided to go to the ED.  Upon arrival she had an abnormal EKG and an elevated troponin.  She was admitted for observation.  She had continued elevation in troponin with a peak of 6.012.  [facility] did not have a functioning cath lab at this time and she was transferred here to undergo cardiac catheterization.  She was given sublingual nitroglycerin along with 1 inch Nitropaste at time of admission to her local hospital.  She has been chest pain-free since then.          Social History     Social History   • Marital status:      Spouse name: N/A   • Number of children: N/A   • Years of education: N/A     Occupational History   • Not on file.     Social History Main Topics   • Smoking status: Never Smoker   • Smokeless tobacco: Not on file   • Alcohol use No   • Drug use: No   • Sexual activity: Not on file     Other Topics Concern   • Not on file     Social History Narrative   • No narrative on file     No family history on file.  Past Surgical History:   Procedure Laterality Date   • HYSTERECTOMY         Review of Systems:  Pertinent positives are listed above and in physical exam.  All others have been reviewed and are negative.     Objective:   Vitals:   vitals were not taken for this visit. No intake or output data in the 24 hours ending 08/15/17 1115      Physical Exam:  General Appearance:    Alert, cooperative, in no acute distress   Head:    Normocephalic, without obvious abnormality, atraumatic   Eyes:            Lids and lashes normal, conjunctivae and sclerae normal, no   icterus, no pallor,  corneas clear, PERRLA. + glasses.     Ears:    Ears appear intact with no abnormalities noted   Throat:   No oral lesions, no thrush, oral mucosa moist   Neck:   No adenopathy, supple, trachea midline, no thyromegaly, no     carotid bruit, no JVD   Back:     No kyphosis present, no scoliosis present, no skin lesions,       erythema or scars, no tenderness to percussion or                   palpation,   range of motion normal   Lungs:     Clear to auscultation,respirations regular, even and                   unlabored    Heart:    Regular rhythm and normal rate, normal S1 and S2, no            murmur, no gallop, no rub, no click       Abdomen:     Normal bowel sounds, no masses, no organomegaly, soft        non-tender, non-distended, no guarding, no rebound                 tenderness       Extremities:   Moves all extremities well,  no cyanosis, no redness, no edema   Pulses:   Pulses palpable and equal bilaterally   Skin:   No bleeding, bruising or rash.  Freckles .   Lymph nodes:   No palpable adenopathy   Neurologic:   Cranial nerves 2 - 12 grossly intact, sensation intact, DTR        present and equal bilaterally          Results Review:  I personally reviewed the patient's clinical results.    Results from last 7 days  Lab Units 08/13/17 2221   WBC 10*3/mm3 5.48   HEMOGLOBIN g/dL 12.9   HEMATOCRIT % 40.3   PLATELETS 10*3/mm3 157       Results from last 7 days  Lab Units 08/13/17  1902   SODIUM mmol/L 141   POTASSIUM mmol/L 3.9   CHLORIDE mmol/L 106   CO2 mmol/L 27.5   BUN mg/dL 14   CREATININE mg/dL 0.72   CALCIUM mg/dL 9.5   BILIRUBIN mg/dL 0.8   ALK PHOS U/L 90   ALT (SGPT) U/L 25   AST (SGOT) U/L 23   GLUCOSE mg/dL 183*     Troponin #1:0.266  Troponin #2:2.977  Troponin #3:4.92  Troponin #4: 46.012  Troponin#5: 5.153    CK-MB: 4.5, 8.2        Results from last 7 days  Lab Units 08/13/17  2221 08/13/17  1902   INR  0.97 0.96           Results from last 7 days  Lab Units 08/13/17  2053   CHOLESTEROL mg/dL 242*    TRIGLYCERIDES mg/dL 166*   HDL CHOL mg/dL 42*   LDL    167        Radiology:  Imaging Results (last 72 hours)     ** No results found for the last 72 hours. **        ECHO:  EF 45%, mild MR    Tele:  NSR    Assessment and Plan:    1. NSTEMI: Patient was transferred to [facility] to undergo left heart catheterization.  Risks and benefits of the procedure were reviewed.  Patient wishes to proceed.  We'll continue with aspirin, statin,  Plavix. Continue ACEI, BB as BP allows and monitor first-degree A-V block by EKG.   2.  Essential Hypertension:  Continue Home ACEI. Continue Coreg (started at osh).  3.  Mixed Dyslipidemia:  Lipid panel reviewed.  Will continue statin therapy and appropriate diet.  4.  Acid reflux: Patient to be continued on her Protonix.    I discussed the patients findings and my recommendations with the patient, any present family members, and the nursing staff.  Emilio Sampson MD saw and examined patient, verified hx and PE, read all radiographic studies, reviewed labs and micro data, and formulated dx, plan for treatment and all medical decision making.      Judy Nagel PA-C for Emilio Sampson MD  08/15/17 11:15 AM      EMR Dragon/Transcription disclaimer:   Much of this encounter note is an electronic transcription/translation of spoken language to printed text. The electronic translation of spoken language may permit erroneous, or at times, nonsensical words or phrases to be inadvertently transcribed; Although I have reviewed the note for such errors, some may still exist.        I, Emilio Sampson MD, personally performed the services as scribed by the above named individual. I have made any necessary edits and it is both accurate and complete.     Emilio Sampson MD, MSc, [facility]  Interventional Cardiology  [facility] at [facility]

## 2017-08-16 LAB
ANION GAP SERPL CALCULATED.3IONS-SCNC: 7 MMOL/L (ref 3–11)
BUN BLD-MCNC: 15 MG/DL (ref 9–23)
BUN/CREAT SERPL: 21.4 (ref 7–25)
CALCIUM SPEC-SCNC: 9.8 MG/DL (ref 8.7–10.4)
CHLORIDE SERPL-SCNC: 102 MMOL/L (ref 99–109)
CO2 SERPL-SCNC: 30 MMOL/L (ref 20–31)
CREAT BLD-MCNC: 0.7 MG/DL (ref 0.6–1.3)
DEPRECATED RDW RBC AUTO: 45.8 FL (ref 37–54)
ERYTHROCYTE [DISTWIDTH] IN BLOOD BY AUTOMATED COUNT: 14.3 % (ref 11.3–14.5)
GFR SERPL CREATININE-BSD FRML MDRD: 83 ML/MIN/1.73
GLUCOSE BLD-MCNC: 146 MG/DL (ref 70–100)
HCT VFR BLD AUTO: 42.2 % (ref 34.5–44)
HGB BLD-MCNC: 13.3 G/DL (ref 11.5–15.5)
MAGNESIUM SERPL-MCNC: 2.1 MG/DL (ref 1.3–2.7)
MCH RBC QN AUTO: 27.6 PG (ref 27–31)
MCHC RBC AUTO-ENTMCNC: 31.5 G/DL (ref 32–36)
MCV RBC AUTO: 87.6 FL (ref 80–99)
PLATELET # BLD AUTO: 169 10*3/MM3 (ref 150–450)
PMV BLD AUTO: 11.2 FL (ref 6–12)
POTASSIUM BLD-SCNC: 3.7 MMOL/L (ref 3.5–5.5)
RBC # BLD AUTO: 4.82 10*6/MM3 (ref 3.89–5.14)
SODIUM BLD-SCNC: 139 MMOL/L (ref 132–146)
WBC NRBC COR # BLD: 9.36 10*3/MM3 (ref 3.5–10.8)

## 2017-08-16 PROCEDURE — 25010000002 ENOXAPARIN PER 10 MG: Performed by: INTERNAL MEDICINE

## 2017-08-16 PROCEDURE — 85027 COMPLETE CBC AUTOMATED: CPT | Performed by: INTERNAL MEDICINE

## 2017-08-16 PROCEDURE — 80048 BASIC METABOLIC PNL TOTAL CA: CPT | Performed by: INTERNAL MEDICINE

## 2017-08-16 PROCEDURE — 25010000002 ONDANSETRON PER 1 MG: Performed by: INTERNAL MEDICINE

## 2017-08-16 PROCEDURE — 99232 SBSQ HOSP IP/OBS MODERATE 35: CPT | Performed by: INTERNAL MEDICINE

## 2017-08-16 PROCEDURE — 83735 ASSAY OF MAGNESIUM: CPT | Performed by: INTERNAL MEDICINE

## 2017-08-16 PROCEDURE — 93005 ELECTROCARDIOGRAM TRACING: CPT | Performed by: INTERNAL MEDICINE

## 2017-08-16 PROCEDURE — 93010 ELECTROCARDIOGRAM REPORT: CPT | Performed by: INTERNAL MEDICINE

## 2017-08-16 RX ORDER — SODIUM CHLORIDE 9 MG/ML
75 INJECTION, SOLUTION INTRAVENOUS CONTINUOUS
Status: DISCONTINUED | OUTPATIENT
Start: 2017-08-16 | End: 2017-08-17 | Stop reason: HOSPADM

## 2017-08-16 RX ORDER — POTASSIUM CHLORIDE 750 MG/1
40 CAPSULE, EXTENDED RELEASE ORAL AS NEEDED
Status: DISCONTINUED | OUTPATIENT
Start: 2017-08-16 | End: 2017-08-17 | Stop reason: HOSPADM

## 2017-08-16 RX ORDER — MAGNESIUM SULFATE HEPTAHYDRATE 40 MG/ML
2 INJECTION, SOLUTION INTRAVENOUS AS NEEDED
Status: DISCONTINUED | OUTPATIENT
Start: 2017-08-16 | End: 2017-08-17 | Stop reason: HOSPADM

## 2017-08-16 RX ORDER — POTASSIUM CHLORIDE 7.45 MG/ML
10 INJECTION INTRAVENOUS
Status: DISCONTINUED | OUTPATIENT
Start: 2017-08-16 | End: 2017-08-17 | Stop reason: HOSPADM

## 2017-08-16 RX ORDER — ONDANSETRON 2 MG/ML
4 INJECTION INTRAMUSCULAR; INTRAVENOUS ONCE
Status: COMPLETED | OUTPATIENT
Start: 2017-08-16 | End: 2017-08-16

## 2017-08-16 RX ORDER — ALUMINA, MAGNESIA, AND SIMETHICONE 2400; 2400; 240 MG/30ML; MG/30ML; MG/30ML
15 SUSPENSION ORAL ONCE
Status: COMPLETED | OUTPATIENT
Start: 2017-08-16 | End: 2017-08-16

## 2017-08-16 RX ORDER — MAGNESIUM SULFATE HEPTAHYDRATE 40 MG/ML
4 INJECTION, SOLUTION INTRAVENOUS AS NEEDED
Status: DISCONTINUED | OUTPATIENT
Start: 2017-08-16 | End: 2017-08-17 | Stop reason: HOSPADM

## 2017-08-16 RX ORDER — CALCIUM CARBONATE 200(500)MG
2 TABLET,CHEWABLE ORAL 3 TIMES DAILY PRN
Status: DISCONTINUED | OUTPATIENT
Start: 2017-08-16 | End: 2017-08-17 | Stop reason: HOSPADM

## 2017-08-16 RX ORDER — POTASSIUM CHLORIDE 1.5 G/1.77G
40 POWDER, FOR SOLUTION ORAL AS NEEDED
Status: DISCONTINUED | OUTPATIENT
Start: 2017-08-16 | End: 2017-08-17 | Stop reason: HOSPADM

## 2017-08-16 RX ORDER — CARVEDILOL 12.5 MG/1
12.5 TABLET ORAL EVERY 12 HOURS SCHEDULED
Status: DISCONTINUED | OUTPATIENT
Start: 2017-08-16 | End: 2017-08-17

## 2017-08-16 RX ADMIN — LIDOCAINE HYDROCHLORIDE 15 ML: 20 SOLUTION ORAL; TOPICAL at 05:28

## 2017-08-16 RX ADMIN — ONDANSETRON 4 MG: 4 TABLET, FILM COATED ORAL at 13:28

## 2017-08-16 RX ADMIN — ASPIRIN 81 MG: 81 TABLET, COATED ORAL at 09:02

## 2017-08-16 RX ADMIN — TICAGRELOR 90 MG: 90 TABLET ORAL at 16:31

## 2017-08-16 RX ADMIN — NITROGLYCERIN 1 INCH: 20 OINTMENT TOPICAL at 13:28

## 2017-08-16 RX ADMIN — CARVEDILOL 6.25 MG: 6.25 TABLET, FILM COATED ORAL at 09:02

## 2017-08-16 RX ADMIN — ACETAMINOPHEN 650 MG: 325 TABLET, FILM COATED ORAL at 16:30

## 2017-08-16 RX ADMIN — CARVEDILOL 12.5 MG: 12.5 TABLET, FILM COATED ORAL at 22:18

## 2017-08-16 RX ADMIN — LISINOPRIL 20 MG: 20 TABLET ORAL at 09:02

## 2017-08-16 RX ADMIN — NITROGLYCERIN 1 INCH: 20 OINTMENT TOPICAL at 00:18

## 2017-08-16 RX ADMIN — NITROGLYCERIN 1 INCH: 20 OINTMENT TOPICAL at 05:40

## 2017-08-16 RX ADMIN — SODIUM CHLORIDE 75 ML/HR: 9 INJECTION, SOLUTION INTRAVENOUS at 05:40

## 2017-08-16 RX ADMIN — SODIUM CHLORIDE 75 ML/HR: 9 INJECTION, SOLUTION INTRAVENOUS at 16:31

## 2017-08-16 RX ADMIN — ENOXAPARIN SODIUM 40 MG: 40 INJECTION SUBCUTANEOUS at 09:02

## 2017-08-16 RX ADMIN — TICAGRELOR 90 MG: 90 TABLET ORAL at 09:02

## 2017-08-16 RX ADMIN — CALCIUM CARBONATE 2 TABLET: 500 TABLET ORAL at 03:00

## 2017-08-16 RX ADMIN — ONDANSETRON 4 MG: 2 INJECTION INTRAMUSCULAR; INTRAVENOUS at 05:03

## 2017-08-16 RX ADMIN — ALUMINUM HYDROXIDE, MAGNESIUM HYDROXIDE, AND DIMETHICONE 15 ML: 400; 400; 40 SUSPENSION ORAL at 05:28

## 2017-08-16 RX ADMIN — PANTOPRAZOLE SODIUM 40 MG: 40 TABLET, DELAYED RELEASE ORAL at 09:02

## 2017-08-16 NOTE — PROGRESS NOTES
[facility] at [facility]    Inpatient Progress Note      Chief Complaint/Reason for service:    · Chest pain, NSTEMI         Subjective:       Nausea, emesis, dry heave, burning in throat overnight. Mild nausea and no chest pain during day. Walking around now without difficulty. Tolerated dinner    Past medical, surgical, social and family history reviewed in the patient's electronic medical record.    Review of Systems:   Positive for nausea, emesis, dry heave, throat burning  Negative for exertional chest pain, dyspnea with exertion, orthopnea, PND, lower extremity edema, palpitations, lightheadedness, syncope.     Problem List  Active Hospital Problems (** Indicates Principal Problem)    Diagnosis Date Noted   • NSTEMI (non-ST elevated myocardial infarction) [I21.4] 08/15/2017      Resolved Hospital Problems    Diagnosis Date Noted Date Resolved   No resolved problems to display.            Objective:      Current Facility-Administered Medications:   •  acetaminophen (TYLENOL) tablet 650 mg, 650 mg, Oral, Q4H PRN, Emilio Sampson MD, 650 mg at 08/16/17 1630  •  aspirin EC tablet 81 mg, 81 mg, Oral, Daily, ALEX Diego, 81 mg at 08/16/17 0902  •  calcium carbonate (TUMS) chewable tablet 500 mg (200 mg elemental), 2 tablet, Oral, TID PRN, Hugh Quiroz MD, 2 tablet at 08/16/17 0300  •  carvedilol (COREG) tablet 6.25 mg, 6.25 mg, Oral, Q12H, Emilio Sampson MD, 6.25 mg at 08/16/17 0902  •  enoxaparin (LOVENOX) syringe 40 mg, 40 mg, Subcutaneous, Daily, Emilio Sampson MD, 40 mg at 08/16/17 0902  •  lisinopril (PRINIVIL,ZESTRIL) tablet 20 mg, 20 mg, Oral, Daily, Emilio Sampson MD, 20 mg at 08/16/17 0902  •  Magnesium Sulfate 2 gram Bolus, followed by 8 gram infusion (total Mg dose 10 grams)- Mg less than or equal to 1mg/dL, 2 g, Intravenous, PRN **OR** Magnesium Sulfate 6 gram Infusion (2 gm x 3) -Mg 1.1 -1.5 mg/dL, 2 g, Intravenous, PRN **OR** magnesium sulfate 4 gram infusion- Mg  1.6-1.9 mg/dL, 4 g, Intravenous, PRN, Hugh Quiroz MD  •  nitroglycerin (NITROSTAT) ointment 1 inch, 1 inch, Topical, Q6H, Emilio Sampson MD, 1 inch at 08/16/17 1328  •  nitroglycerin (NITROSTAT) SL tablet 0.4 mg, 0.4 mg, Sublingual, Q5 Min PRN, Emilio Sampson MD  •  ondansetron (ZOFRAN) tablet 4 mg, 4 mg, Oral, Q6H PRN, 4 mg at 08/16/17 1328 **OR** ondansetron (ZOFRAN) injection 4 mg, 4 mg, Intravenous, Q6H PRN, Emilio Sampson MD, 4 mg at 08/15/17 2319  •  pantoprazole (PROTONIX) EC tablet 40 mg, 40 mg, Oral, Daily, ALEX Diego, 40 mg at 08/16/17 0902  •  Pharmacy Consult - [facility], , Does not apply, Daily, Steve Dhaliwal [facility]  •  Pharmacy Meds to Bed Consult, , Does not apply, Daily, Steve Dhaliwal [facility]  •  potassium chloride (MICRO-K) CR capsule 40 mEq, 40 mEq, Oral, PRN **OR** potassium chloride (KLOR-CON) packet 40 mEq, 40 mEq, Oral, PRN **OR** potassium chloride 10 mEq in 100 mL IVPB, 10 mEq, Intravenous, Q1H PRN, Hugh Quiroz MD  •  sodium chloride 0.9 % infusion, 75 mL/hr, Intravenous, Continuous, Hugh Quiroz MD, Last Rate: 75 mL/hr at 08/16/17 1631, 75 mL/hr at 08/16/17 1631  •  ticagrelor (BRILINTA) tablet 90 mg, 90 mg, Oral, BID, Emilio Sampson MD, 90 mg at 08/16/17 1631      Vital Sign Min/Max for last 24 hours  Temp  Min: 97.5 °F (36.4 °C)  Max: 99.2 °F (37.3 °C)   BP  Min: 135/61  Max: 150/86   Pulse  Min: 63  Max: 85   Resp  Min: 16  Max: 18   SpO2  Min: 94 %  Max: 96 %   No Data Recorded      Intake/Output Summary (Last 24 hours) at 08/16/17 1927  Last data filed at 08/16/17 1815   Gross per 24 hour   Intake              360 ml   Output                0 ml   Net              360 ml           CONSTITUTIONAL: No acute distress, normal affect  RESPIRATORY: Normal effort. Clear to auscultation bilaterally without wheezing or rales  CARDIOVASCULAR: Jugular venous pressure within normal limits. Regular rate and rhythm with normal S1 and S2. Without murmur, gallop or  rub.  PERIPHERAL VASCULAR: Normal radial pulses bilaterally. There is no peripheral edema bilaterally.    Results Review:   I reviewed the patient's recent labs in the electronic medical record.      Tele:  NSR, few PVCs    [facility] 8/15/17  · There was severe 1 vessel coronary artery disease involving the LAD.  There is mild coronary artery disease of the left main, RCA.  · There was an 80% discrete stenosis of the proximal LAD followed by 50% tandem stenoses in the mid segment.  The lesions are now status post tandem drug-eluting stents.  A 2.75 x 38 mm drug-eluting stent was placed proximally and a 2.5 x 8 mm drug-eluting stent was placed distally with a brief segment of overlap.  The stents were postdilated with a 2.75 mm noncompliant balloon.  · Mildly depressed left ventricular ejection fraction 45% with hypokinesis of the mid to distal anterior segments and apical segments       TTE 8/14/17  · The study is technically adequate for diagnosis.  · Left ventricular systolic function is low normal. Estimated EF = 45%.  · Left ventricular diastolic dysfunction (grade II) consistent with pseudonormalization.  · Mild aortic valve regurgitation is present.  · Mild mitral valve regurgitation is present  · There is no evidence of pericardial effusion.       Assessment/Plan:       CAD, NSTEMI  -s/p PCI to LAD  -ASA, ticagrelor, carvedilol  -, 8/2017  -Intolerant to simvatatin, atorvastatin, rosuvastatin  -Trial of pravastatin on discharge  -Initiated paperwork for PCSK-9 inhibitor, Repatha    Acute systolic heart failure, EF 45%; NSVT  -Carvedilol, lisinopril    Hypertension  -Uptitrated carvedilol    -Likely discharge tomorrow  -Follow up with cardiology in [place] or here    Emilio Sampson MD, MSc, [facility]  Interventional Cardiology  [facility] at [facility]  8/16/2017

## 2017-08-16 NOTE — NURSING NOTE
Pt. With nausea/vomiting/dry heaving throughout the night. Dr. Quiroz notified that pt. Has a prolonged QT (442) to make sure that giving zofran was still appropriate. Dr. Quiroz with orders to go ahead and give zofran at that time. Pt. Continuing to have nausea/vomiting throughout the night. Dr. Quiroz notified of this with new orders for normal saline @ 75 ml/hr, BMP, Mg, GI Cocktail, K and Mg replacement if needed, 4 mg of zofran, and stat EKG. All of these orders performed and pt. Feeling better at this time.

## 2017-08-16 NOTE — PROGRESS NOTES
Discharge Planning Assessment  [facility]     Patient Name: Liliya Granado  MRN: [id number]  Today's Date: 8/16/2017    Admit Date: 8/15/2017          Discharge Needs Assessment       08/16/17 1045    Living Environment    Lives With spouse    Living Arrangements mobile home    Home Accessibility no concerns    Type of Financial/Environmental Concern none    Transportation Available car;family or friend will provide    Living Environment    Provides Primary Care For no one    Quality Of Family Relationships supportive;helpful;involved    Able to Return to Prior Living Arrangements yes    Discharge Needs Assessment    Concerns To Be Addressed no discharge needs identified;denies needs/concerns at this time    Readmission Within The Last 30 Days no previous admission in last 30 days    Anticipated Changes Related to Illness none    Equipment Currently Used at Home none    Equipment Needed After Discharge none    Discharge Disposition home or self-care    Discharge Contact Information if Applicable Leighton Granado, spouse  [phone]            Discharge Plan       08/16/17 1046    Case Management/Social Work Plan    Plan Home    Patient/Family In Agreement With Plan yes    Additional Comments Spoke with patient and  at bedside regarding discharge planning.  Patient denies use of Home Health or DME.  Denies difficulty affording medication.  Patient lives with her  in a mobile home with no steps to access.  CM following for discharge needs.  Patient plans to discharge home via car with family to transport when medically ready.        Discharge Placement     No information found        Expected Discharge Date and Time     Expected Discharge Date Expected Discharge Time    Aug 18, 2017               Demographic Summary       08/16/17 1042    Referral Information    Admission Type inpatient    Arrived From home or self-care    Referral Source admission list    Reason For Consult discharge planning    Record  Reviewed clinical discipline documentation;history and physical;patient profile    Primary Care Physician Information    Name Steve Martinez MD            Functional Status       08/16/17 1043    Functional Status Current    Current Functional Level Comment Per Nursing Assessment    Functional Status Prior    Ambulation 0-->independent    Transferring 0-->independent    Toileting 0-->independent    Bathing 0-->independent    Dressing 0-->independent    Eating 0-->independent    Communication 0-->understands/communicates without difficulty    Swallowing 0-->swallows foods/liquids without difficulty    IADL    Medications independent    Meal Preparation independent    Housekeeping independent    Laundry independent    Shopping independent    Oral Care independent    Activity Tolerance    Current Activity Limitations none    Usual Activity Tolerance good    Current Activity Tolerance moderate    Employment/Financial    Employment/Finance Comments Medicare/[facility]            Psychosocial     None            Abuse/Neglect     None            Legal     None            Substance Abuse     None            Patient Forms     None          Lissette Del Rio, RN

## 2017-08-16 NOTE — PROGRESS NOTES
Pt. Referred for Phase II Cardiac Rehab. Staff discussed benefits of exercise, program protocol, and educational material provided. Teach back verified.  Permission granted from patient for staff to fax referral information to outlying program at this time.  Staff to fax referral info to [facility].

## 2017-08-16 NOTE — PLAN OF CARE
Problem: Patient Care Overview (Adult)  Goal: Plan of Care Review  Outcome: Ongoing (interventions implemented as appropriate)  Goal: Discharge Needs Assessment  Outcome: Ongoing (interventions implemented as appropriate)    Problem: Cardiac Catheterization with/without PCI (Adult)  Goal: Signs and Symptoms of Listed Potential Problems Will be Absent or Manageable (Cardiac Catheterization with/without PCI)  Outcome: Ongoing (interventions implemented as appropriate)

## 2017-08-16 NOTE — PLAN OF CARE
Problem: Patient Care Overview (Adult)  Goal: Plan of Care Review  Outcome: Ongoing (interventions implemented as appropriate)    08/16/17 1422   Coping/Psychosocial Response Interventions   Plan Of Care Reviewed With patient   Patient Care Overview   Progress progress toward functional goals as expected         Problem: Cardiac Catheterization with/without PCI (Adult)  Goal: Signs and Symptoms of Listed Potential Problems Will be Absent or Manageable (Cardiac Catheterization with/without PCI)  Outcome: Ongoing (interventions implemented as appropriate)    08/16/17 1422   Cardiac Catheterization with/without PCI   Problems Assessed (Cardiac Catheterization) cardiopulmonary complications   Problems Present (Cardiac Catheterization) none

## 2017-08-17 VITALS
HEART RATE: 75 BPM | HEIGHT: 62 IN | RESPIRATION RATE: 16 BRPM | DIASTOLIC BLOOD PRESSURE: 60 MMHG | BODY MASS INDEX: 23.29 KG/M2 | SYSTOLIC BLOOD PRESSURE: 122 MMHG | TEMPERATURE: 99.1 F | WEIGHT: 126.54 LBS | OXYGEN SATURATION: 94 %

## 2017-08-17 PROCEDURE — 93010 ELECTROCARDIOGRAM REPORT: CPT | Performed by: INTERNAL MEDICINE

## 2017-08-17 PROCEDURE — 99238 HOSP IP/OBS DSCHRG MGMT 30/<: CPT | Performed by: INTERNAL MEDICINE

## 2017-08-17 PROCEDURE — 25010000002 ENOXAPARIN PER 10 MG: Performed by: INTERNAL MEDICINE

## 2017-08-17 PROCEDURE — 93005 ELECTROCARDIOGRAM TRACING: CPT | Performed by: INTERNAL MEDICINE

## 2017-08-17 RX ORDER — EZETIMIBE 10 MG/1
10 TABLET ORAL DAILY
Qty: 30 TABLET | Refills: 11 | Status: SHIPPED | OUTPATIENT
Start: 2017-08-17 | End: 2018-02-01 | Stop reason: SINTOL

## 2017-08-17 RX ORDER — NITROGLYCERIN 0.4 MG/1
0.4 TABLET SUBLINGUAL
Qty: 25 TABLET | Refills: 11 | Status: SHIPPED | OUTPATIENT
Start: 2017-08-17 | End: 2022-04-04 | Stop reason: SDUPTHER

## 2017-08-17 RX ORDER — LISINOPRIL 10 MG/1
10 TABLET ORAL DAILY
Qty: 30 TABLET | Refills: 11 | Status: SHIPPED | OUTPATIENT
Start: 2017-08-17 | End: 2018-02-01 | Stop reason: SDUPTHER

## 2017-08-17 RX ORDER — CARVEDILOL 6.25 MG/1
6.25 TABLET ORAL EVERY 12 HOURS SCHEDULED
Qty: 60 TABLET | Refills: 11 | Status: SHIPPED | OUTPATIENT
Start: 2017-08-17 | End: 2018-02-01 | Stop reason: SDUPTHER

## 2017-08-17 RX ORDER — LISINOPRIL 10 MG/1
10 TABLET ORAL DAILY
Status: DISCONTINUED | OUTPATIENT
Start: 2017-08-17 | End: 2017-08-17 | Stop reason: HOSPADM

## 2017-08-17 RX ORDER — CARVEDILOL 6.25 MG/1
6.25 TABLET ORAL EVERY 12 HOURS SCHEDULED
Status: DISCONTINUED | OUTPATIENT
Start: 2017-08-17 | End: 2017-08-17 | Stop reason: HOSPADM

## 2017-08-17 RX ADMIN — PANTOPRAZOLE SODIUM 40 MG: 40 TABLET, DELAYED RELEASE ORAL at 08:32

## 2017-08-17 RX ADMIN — ENOXAPARIN SODIUM 40 MG: 40 INJECTION SUBCUTANEOUS at 08:31

## 2017-08-17 RX ADMIN — LISINOPRIL 10 MG: 10 TABLET ORAL at 08:32

## 2017-08-17 RX ADMIN — TICAGRELOR 90 MG: 90 TABLET ORAL at 08:32

## 2017-08-17 RX ADMIN — CARVEDILOL 6.25 MG: 6.25 TABLET, FILM COATED ORAL at 08:32

## 2017-08-17 RX ADMIN — ASPIRIN 81 MG: 81 TABLET, COATED ORAL at 08:32

## 2017-08-17 NOTE — DISCHARGE SUMMARY
[facility]  DISCHARGE SUMMARY    Date of Discharge:  8/17/2017    Discharge Diagnosis: NSTEMI    Presenting Problem/History of Present Illness  Elevated troponin [R79.89]  NSTEMI (non-ST elevated myocardial infarction) [I21.4]    Hospital Course  Patient is a 66 y.o. female presented with chest pain and an NSTEMI from [facility].  The patient underwent left heart catheterization which revealed an 80% proximal LAD stenosis and tandem 50% stenoses distal to the proximal lesion.  Now status post intervention with drug-eluting stents.  Her ejection fraction was noted to be 45% with hypokinesis of the mid to distal anterior segments and apical segments.  The patient developed a delayed contrast reaction post procedurally with nausea, vomiting, dry heaves.  This was treated medically and by the next morning the patient had improved.  The patient was seen stable for discharge today.    The patient has a known intolerance to simvastatin, atorvastatin, rosuvastatin.  The patient will be trialed on Zetia. Paperwork for a PCS K-9 inhibitor has been initiated.      The patient has been referred to [facility] cardiac rehabilitation    Procedures Performed  Procedure(s):  Left Heart Cath       Pertinent Test Results:   [facility] 8/15/17  · There was severe 1 vessel coronary artery disease involving the LAD.  There is mild coronary artery disease of the left main, RCA.  · There was an 80% discrete stenosis of the proximal LAD followed by 50% tandem stenoses in the mid segment.  The lesions are now status post tandem drug-eluting stents.  A 2.75 x 38 mm drug-eluting stent was placed proximally and a 2.5 x 8 mm drug-eluting stent was placed distally with a brief segment of overlap.  The stents were postdilated with a 2.75 mm noncompliant balloon.  · Mildly depressed left ventricular ejection fraction 45% with hypokinesis of the mid to distal anterior segments and apical  segments        TTE 8/14/17  · The study is technically adequate for diagnosis.  · Left ventricular systolic function is low normal. Estimated EF = 45%.  · Left ventricular diastolic dysfunction (grade II) consistent with pseudonormalization.  · Mild aortic valve regurgitation is present.  · Mild mitral valve regurgitation is present  · There is no evidence of pericardial effusion.       Condition on Discharge:  Stable    Physical Exam at Discharge  Vital Signs  Temp:  [98.3 °F (36.8 °C)-99.2 °F (37.3 °C)] 99.1 °F (37.3 °C)  Heart Rate:  [64-79] 75  Resp:  [16-18] 16  BP: ()/(57-77) 122/60  Physical Exam:  GEN: NAD  PULM:CTAB  CV: RRR    Discharge Disposition: Home    Discharge Diet: Cardiac heart healthy diet    Activity at Discharge: As tolerated    Follow-up Appointments  -Follow up with Dr. Sampson in the cardiology clinic in 1 month  -Follow up with your primary care doctor, Dr. Martinez, in 2 weeks; of note, hemoglobin A1c of 5.8%    Discharge Medications   Liliya Granado   Home Medication Instructions SANDRA:[id number]    Printed on:08/17/17 0953   Medication Information                      aspirin 81 MG EC tablet  Take 81 mg by mouth Daily.             carvedilol (COREG) 6.25 MG tablet  Take 1 tablet by mouth Every 12 (Twelve) Hours.             ezetimibe (ZETIA) 10 MG tablet  Take 1 tablet by mouth Daily.             lisinopril (PRINIVIL,ZESTRIL) 10 MG tablet  Take 1 tablet by mouth Daily.             nitroglycerin (NITROSTAT) 0.4 MG SL tablet  Place 1 tablet under the tongue Every 5 (Five) Minutes As Needed for Chest Pain. Take no more than 3 doses in 15 minutes.             Omega-3 Fatty Acids (FISH OIL) 1000 MG capsule capsule  Take 1,000 mg by mouth Daily With Breakfast.             pantoprazole (PROTONIX) 40 MG EC tablet  Take 40 mg by mouth Daily.             ticagrelor (BRILINTA) 90 MG tablet tablet  Take 1 tablet by mouth 2 (Two) Times a Day.                  Emilio Sampson MD  08/17/17  9:48  AM    Time: Discharge 20 min

## 2017-08-17 NOTE — PLAN OF CARE
Problem: Patient Care Overview (Adult)  Goal: Plan of Care Review  Outcome: Ongoing (interventions implemented as appropriate)    08/16/17 1422 08/16/17 2030   Coping/Psychosocial Response Interventions   Plan Of Care Reviewed With --  patient   Patient Care Overview   Progress progress toward functional goals as expected --          Problem: Cardiac Catheterization with/without PCI (Adult)  Goal: Signs and Symptoms of Listed Potential Problems Will be Absent or Manageable (Cardiac Catheterization with/without PCI)  Outcome: Ongoing (interventions implemented as appropriate)    08/16/17 1422   Cardiac Catheterization with/without PCI   Problems Assessed (Cardiac Catheterization) cardiopulmonary complications   Problems Present (Cardiac Catheterization) none

## 2017-09-18 ENCOUNTER — OFFICE VISIT (OUTPATIENT)
Dept: CARDIOLOGY | Facility: CLINIC | Age: 67
End: 2017-09-18

## 2017-09-18 VITALS
SYSTOLIC BLOOD PRESSURE: 130 MMHG | HEIGHT: 62 IN | HEART RATE: 60 BPM | WEIGHT: 128.8 LBS | BODY MASS INDEX: 23.7 KG/M2 | DIASTOLIC BLOOD PRESSURE: 62 MMHG

## 2017-09-18 DIAGNOSIS — I25.10 CORONARY ARTERY DISEASE INVOLVING NATIVE CORONARY ARTERY OF NATIVE HEART WITHOUT ANGINA PECTORIS: Primary | ICD-10-CM

## 2017-09-18 DIAGNOSIS — R09.89 CAROTID BRUIT, UNSPECIFIED LATERALITY: ICD-10-CM

## 2017-09-18 DIAGNOSIS — I10 ESSENTIAL HYPERTENSION: ICD-10-CM

## 2017-09-18 DIAGNOSIS — E78.00 HYPERCHOLESTEROLEMIA: ICD-10-CM

## 2017-09-18 PROBLEM — R07.9 CHEST PAIN AT REST: Status: RESOLVED | Noted: 2017-08-13 | Resolved: 2017-09-18

## 2017-09-18 PROCEDURE — 99214 OFFICE O/P EST MOD 30 MIN: CPT | Performed by: INTERNAL MEDICINE

## 2017-09-18 PROCEDURE — 93000 ELECTROCARDIOGRAM COMPLETE: CPT | Performed by: INTERNAL MEDICINE

## 2017-09-18 NOTE — PROGRESS NOTES
[facility] AT [address], [address]  [place], [place], [address] [phone]  [email or web address]           OUTPATIENT CLINIC FOLLOW UP NOTE    Patient Care Team:  Patient Care Team:  Steve Martinez MD as PCP - General (Family Medicine)    Subjective:   Reason for consultation:   Chief Complaint   Patient presents with   • Chest Pain     follow up       HPI:    Liliya Granado is a 66 y.o. female.  History of Present Illness  The patient has a history of hypertension, hyperlipidemia with an intolerance to statins, who presented with chest pain and an NSTEMI in 8/2017, underwent left heart catheterization which revealed an 80% proximal LAD stenosis and tandem 50% distal LAD stenoses.  The lesions are now status post intervention.  Her ejection fraction was noted to be 45% on discharge with mild hypokinesis of the mid to distal anterior segments and apical segments.  The patient presents for follow-up.    The patient has been doing well.  She denies chest pain.  She has mild exertional dyspnea that resolves with rest but does not limit her from doing her activities of daily living.  She starts cardiac rehabilitation in a few days.    PFSH:  Patient Active Problem List   Diagnosis   • Chest pain at rest   • NSTEMI (non-ST elevated myocardial infarction)         Current Outpatient Prescriptions:   •  aspirin 81 MG EC tablet, Take 81 mg by mouth Daily., Disp: , Rfl:   •  carvedilol (COREG) 6.25 MG tablet, Take 1 tablet by mouth Every 12 (Twelve) Hours., Disp: 60 tablet, Rfl: 11  •  ezetimibe (ZETIA) 10 MG tablet, Take 1 tablet by mouth Daily., Disp: 30 tablet, Rfl: 11  •  lisinopril (PRINIVIL,ZESTRIL) 10 MG tablet, Take 1 tablet by mouth Daily., Disp: 30 tablet, Rfl: 11  •  nitroglycerin (NITROSTAT) 0.4 MG SL tablet, Place 1 tablet under the tongue Every 5 (Five) Minutes As Needed for Chest Pain. Take no more than 3 doses in 15 minutes., Disp: 25 tablet, Rfl: 11  •  Omega-3  "Fatty Acids (FISH OIL) 1000 MG capsule capsule, Take 1,000 mg by mouth Daily With Breakfast., Disp: , Rfl:   •  pantoprazole (PROTONIX) 40 MG EC tablet, Take 40 mg by mouth Daily., Disp: , Rfl:   •  ticagrelor (BRILINTA) 90 MG tablet tablet, Take 1 tablet by mouth 2 (Two) Times a Day., Disp: 60 tablet, Rfl: 11    Allergies   Allergen Reactions   • Codeine         reports that she has never smoked. She does not have any smokeless tobacco history on file.    History reviewed. No pertinent family history.    Review of Systems:  Positive for dyspnea with exertion  Negative for exertional chest pain, orthopnea, PND, lower extremity edema, palpitations, lightheadedness, syncope.   All other systems reviewed and are negative.    Objective:   Blood pressure 130/62, pulse 60, height 62\" (157.5 cm), weight 128 lb 12.8 oz (58.4 kg).  CONSTITUTIONAL: No acute distress, normal affect  RESPIRATORY: Normal effort. Clear to auscultation bilaterally without wheezing or rales  CARDIOVASCULAR: Right carotid bruit.  Regular rate and rhythm with normal S1 and S2. Without murmur, gallop or rub.  PERIPHERAL VASCULAR: Normal radial pulses bilaterally. There is no lower extremity edema bilaterally.    Labs:  Lab Results   Component Value Date    ALT 25 08/13/2017    AST 23 08/13/2017     Lab Results   Component Value Date    CHOL 242 (H) 08/13/2017    TRIG 166 (H) 08/13/2017    HDL 42 (L) 08/13/2017    LDLCALC 167 (H) 08/13/2017    CREATININE 0.70 08/16/2017       Diagnostic Data:      ECG 12 Lead  Date/Time: 9/18/2017 11:17 AM  Performed by: CHLOE WEST  Authorized by: CHLOE WEST   Rhythm: sinus rhythm  Comments: Sinus bradycardia with first-degree AV block, heart rate 54 bpm, QRS 96 ms, QTC 4:30 milliseconds, ST and T wave abnormality in the anterolateral leads which is unchanged when compared to the EKG performed on 8/17/17          [facility] 8/15/17  · There was severe 1 vessel coronary artery disease involving the LAD.  There is mild " coronary artery disease of the left main, RCA.  · There was an 80% discrete stenosis of the proximal LAD followed by 50% tandem stenoses in the mid segment.  The lesions are now status post tandem drug-eluting stents.  A 2.75 x 38 mm drug-eluting stent was placed proximally and a 2.5 x 8 mm drug-eluting stent was placed distally with a brief segment of overlap.  The stents were postdilated with a 2.75 mm noncompliant balloon.  · Mildly depressed left ventricular ejection fraction 45% with hypokinesis of the mid to distal anterior segments and apical segments          TTE 8/14/17  · The study is technically adequate for diagnosis.  · Left ventricular systolic function is low normal. Estimated EF = 45%.  · Left ventricular diastolic dysfunction (grade II) consistent with pseudonormalization.  · Mild aortic valve regurgitation is present.  · Mild mitral valve regurgitation is present  · There is no evidence of pericardial effusion.    Assessment and Plan:   Liliya was seen today for chest pain.    Diagnoses and all orders for this visit:    Coronary artery disease involving native coronary artery of native heart without angina pectoris  Hypercholesterolemia  -CCS class 0, continue aspirin, ticagrelor, carvedilol, Zetia, omega-3 fatty acids  -Ongoing workup for a PCS K-9 inhibitor, paperwork submitted; prior intolerance to simvastatin, atorvastatin, rosuvastatin and developed an NSTEMI while off of statin therapy  -Cardiac rehabilitation to begin later this week    Essential hypertension  -At goal.  If blood pressure averages above 140/90 mmHg, increase lisinopril 20 mg daily    Carotid bruit, unspecified laterality  -     Duplex Carotid Ultrasound CAR; Future      - Dietary and exercise counseling was provided during this visit  - Return in about 3 months (around 12/18/2017).    Emilio Sampson MD, MSc, [facility]  Interventional Cardiology  [facility]

## 2017-09-21 ENCOUNTER — TREATMENT (OUTPATIENT)
Dept: CARDIAC REHAB | Facility: HOSPITAL | Age: 67
End: 2017-09-21

## 2017-09-21 VITALS
BODY MASS INDEX: 23.59 KG/M2 | HEART RATE: 50 BPM | WEIGHT: 128.2 LBS | HEIGHT: 62 IN | OXYGEN SATURATION: 100 % | SYSTOLIC BLOOD PRESSURE: 136 MMHG | DIASTOLIC BLOOD PRESSURE: 80 MMHG | RESPIRATION RATE: 16 BRPM

## 2017-09-21 DIAGNOSIS — Z95.5 STATUS POST INSERTION OF DRUG-ELUTING STENT INTO LEFT ANTERIOR DESCENDING (LAD) ARTERY FOR CORONARY ARTERY DISEASE: ICD-10-CM

## 2017-09-21 DIAGNOSIS — I21.4 NSTEMI (NON-ST ELEVATED MYOCARDIAL INFARCTION) (HCC): Primary | ICD-10-CM

## 2017-09-21 PROCEDURE — 93798 PHYS/QHP OP CAR RHAB W/ECG: CPT

## 2017-09-21 NOTE — PROGRESS NOTES
Cardiac Rehab Initial Assessment      Name: Liliya Granado  :1950 Allergies:Codeine   MRN: [id number] 67 y.o. Physician: Steve Martinez MD   Primary Diagnosis:    Diagnosis Plan   1. NSTEMI (non-ST elevated myocardial infarction)     2. Status post insertion of drug-eluting stent into left anterior descending (LAD) artery for coronary artery disease      Event Date: 8/15/2017 Specialist: Emilio Sampson   Secondary Diagnosis: na Risk Stratification:Moderate Risk Note Author: Elizabeth Rock RN     Cardiovascular History: Previous MI     EXERCISE AT HOME  no  na  N/A    EF: 45%      Source: cath report          Ambulatory Status:Independent  Ambulatory Fall Risk Assessed on Initial Visit: yes 6 Minute Walk Pre- Cardiac Rehab:  Distance:1152ft      RPE:12  Max. HR: 84       SPO2:98    MET: 2.7  Resting BP: 130/86 LA, 128/74 RA    Peak BP: 140/70  Recovery BP: 138/72  Comments: tolerated walk test well no complaints voiced      NUTRITION  Lipids:yes If yes, labs as follows;  Total: No components found for: CHOLESTEROL  HDL:   HDL Cholesterol   Date Value Ref Range Status   2017 42 (L) 60 - 100 mg/dL Final    Lipids continued:  LDL:No results found for: LDL  Triglyceride: No components found for: TRIGLYCERIDE   Weight Management:                 Weight: 128.2 lb  Height: 62 in                                   BMI: Body mass index is 23.45 kg/(m^2).  Waist Circumference: na  inches   Alcohol Use: none Diabetes:No    Last HGBA1C with date if applicable:No components found for: A1C         SOCIAL HISTORY  Social History     Social History   • Marital status:      Spouse name: N/A   • Number of children: N/A   • Years of education: N/A     Social History Main Topics   • Smoking status: Never Smoker   • Smokeless tobacco: Never Used   • Alcohol use No   • Drug use: No   • Sexual activity: Not Asked     Other Topics Concern   • None     Social History Narrative       Educational Level (choose one that  applies) high school diploma/GED Learning Barriers:Ready to Learn    Family Support:yes    Living Arrangement: lives with their spouse    Risk Factors: Stress  Yes and Hyperlipidemia  Yes     Tobacco Adjunct: N/A        Comorbidities: family history of heart disease     PSYCHOSOCIAL  Clinical Depression: no    Stress: yes     Assess presence or absence of depression using a valid screening tool: yes      PHYSICAL ASSESSMENT  Influenza vaccine: no  Pneumococcal vaccine: no          Angina: no    Describe angina scale of 0 - 4: 0 = none    Today are you having incisional pain? N/A. If, Yes, Scale: na        Today are you having any other pain? No. If, Yes, Scale: na     Diagnosed with Hypertension:yes    Heart Sounds: S1S2 No S3 S4     Lung Sounds: normal air entry, lungs clear to auscultation         Assessment: Pt tolerated 6MWT well, NAD noted, no complaints voiced, skin warm, pink and dry, resp within normal limits and even, denies chest discomfort, chest pressure ,SOA .  Monitor shows SB-NSR with 1 AVB, V/S WDL ,see [facility] documentation for exercise data.     Pt educated on Cardiac Rehab Program,  warm up, stretching, use of RPE scale, application of heart monitor, home exercise and exercise guidelines. Diabetic guidelines for cardiac rehab.  Cleaning and use of equipment, s/s to report. Verbal teach back verified   Orthopedic Problems: None    Are you being hurt, hit, or frightened by anyone at home or in your life? no    Are you being neglected by a caregiver? N/A Shoulder flexibility/Range of motion: Average     Recommended arm activity: Any       Leg flexibility: Average      Chose one: Average    Recommended stretching: Standing    Assessment: see above assessment    Family attends IA: no Time of arrival: 1017 am  Time of departure: 1200 pm     Patient Goals:increase strength and endurance and to be able to feel comfortable exercising at home.         9/21/2017  1:03 PM  Elizabeth Rock RN

## 2017-09-26 ENCOUNTER — TREATMENT (OUTPATIENT)
Dept: CARDIAC REHAB | Facility: HOSPITAL | Age: 67
End: 2017-09-26

## 2017-09-26 ENCOUNTER — HOSPITAL ENCOUNTER (OUTPATIENT)
Dept: CARDIOLOGY | Facility: HOSPITAL | Age: 67
Discharge: HOME | End: 2017-09-26
Admitting: INTERNAL MEDICINE

## 2017-09-26 VITALS — DIASTOLIC BLOOD PRESSURE: 74 MMHG | SYSTOLIC BLOOD PRESSURE: 138 MMHG | OXYGEN SATURATION: 98 % | HEART RATE: 51 BPM

## 2017-09-26 DIAGNOSIS — R09.89 CAROTID BRUIT, UNSPECIFIED LATERALITY: ICD-10-CM

## 2017-09-26 DIAGNOSIS — Z95.5 STATUS POST INSERTION OF DRUG-ELUTING STENT INTO LEFT ANTERIOR DESCENDING (LAD) ARTERY FOR CORONARY ARTERY DISEASE: ICD-10-CM

## 2017-09-26 DIAGNOSIS — I21.4 NSTEMI (NON-ST ELEVATED MYOCARDIAL INFARCTION) (HCC): Primary | ICD-10-CM

## 2017-09-26 PROCEDURE — 93880 EXTRACRANIAL BILAT STUDY: CPT | Performed by: RADIOLOGY

## 2017-09-26 PROCEDURE — 93880 EXTRACRANIAL BILAT STUDY: CPT

## 2017-09-26 PROCEDURE — 93798 PHYS/QHP OP CAR RHAB W/ECG: CPT

## 2017-09-26 NOTE — PROGRESS NOTES
Pt attended phase II visit.  Tolerated exercise well, NAD noted, no complaints voiced, skin warm, pink, dry, resp nl and even, denies chest discomfort,NAD noted, no complaints voiced, skin warm, pink and dry, resp within normal limits and even, denies chest discomfort, chest pressure ,SOA .  Monitor shows SB-NSR , V/S WDL ,see [facility] documentation for exercise data.     Education was done with patient on applying monitor, cleaning equipment, stress and stress management, anxiety and depression. We also went over once again the cardiac rehab attendance and cancellation policy.  Written information provided, verbal teach back verified.    Patient stated lately she has felt like she doesn't want to get out of the house and she feels a little down and has spells of crying at times . After talking with her she said her family depends on her for their care and she feel it is hard for her to do after there cardiac episode. She stated when she told her 79 old sister whom fractured her hip and pelvis that she couldn't take care of her and her sister made her feel guilty and she thinks after talking to me she is experiencing some depression does and she really  doesn't have anyone to talk to about it because she doesn't want to upset her .  I told her she can talk to us when she needs to and I was also going get her an appointment with Dr. Martinez this week for him to evaluate.  She said that would be great.     I spoke with Felipa in Dr. Martinez office and let her know what was going on and that Liliya wants to see Dr. Martinez. She scheduled her for Thursday 9/28/2017 at 1145. Liliya is aware of appointment.

## 2017-09-26 NOTE — PATIENT INSTRUCTIONS
Stress and Stress Management  Stress is a normal reaction to life events. It is what you feel when life demands more than you are used to or more than you can handle. Some stress can be useful. For example, the stress reaction can help you catch the last bus of the day, study for a test, or meet a deadline at work. But stress that occurs too often or for too long can cause problems. It can affect your emotional health and interfere with relationships and normal daily activities. Too much stress can weaken your immune system and increase your risk for physical illness. If you already have a medical problem, stress can make it worse.  CAUSES   All sorts of life events may cause stress. An event that causes stress for one person may not be stressful for another person. Major life events commonly cause stress. These may be positive or negative. Examples include losing your job, moving into a new home, getting , having a baby, or losing a loved one. Less obvious life events may also cause stress, especially if they occur day after day or in combination. Examples include working long hours, driving in traffic, caring for children, being in debt, or being in a difficult relationship.  SIGNS AND SYMPTOMS  Stress may cause emotional symptoms including, the following:  · Anxiety. This is feeling worried, afraid, on edge, overwhelmed, or out of control.  · Anger. This is feeling irritated or impatient.  · Depression. This is feeling sad, down, helpless, or guilty.  · Difficulty focusing, remembering, or making decisions.  Stress may cause physical symptoms, including the following:   · Aches and pains. These may affect your head, neck, back, stomach, or other areas of your body.  · Tight muscles or clenched jaw.  · Low energy or trouble sleeping.   Stress may cause unhealthy behaviors, including the following:   · Eating to feel better (overeating) or skipping meals.  · Sleeping too little, too much, or both.  · Working  "too much or putting off tasks (procrastination).  · Smoking, drinking alcohol, or using drugs to feel better.  DIAGNOSIS   Stress is diagnosed through an assessment by your health care provider. Your health care provider will ask questions about your symptoms and any stressful life events. Your health care provider will also ask about your medical history and may order blood tests or other tests. Certain medical conditions and medicine can cause physical symptoms similar to stress.  Mental illness can cause emotional symptoms and unhealthy behaviors similar to stress. Your health care provider may refer you to a mental health professional for further evaluation.   TREATMENT   Stress management is the recommended treatment for stress. The goals of stress management are reducing stressful life events and coping with stress in healthy ways.   Techniques for reducing stressful life events include the following:  · Stress identification. Self-monitor for stress and identify what causes stress for you. These skills may help you to avoid some stressful events.  · Time management. Set your priorities, keep a calendar of events, and learn to say \"no.\" These tools can help you avoid making too many commitments.  Techniques for coping with stress include the following:  · Rethinking the problem. Try to think realistically about stressful events rather than ignoring them or overreacting. Try to find the positives in a stressful situation rather than focusing on the negatives.  · Exercise. Physical exercise can release both physical and emotional tension. The key is to find a form of exercise you enjoy and do it regularly.  · Relaxation techniques. These relax the body and mind. Examples include yoga, meditation, phani chi, biofeedback, deep breathing, progressive muscle relaxation, listening to music, being out in nature, journaling, and other hobbies. Again, the key is to find one or more that you enjoy and can do " regularly.  · Healthy lifestyle. Eat a balanced diet, get plenty of sleep, and do not smoke. Avoid using alcohol or drugs to relax.  · Strong support network. Spend time with family, friends, or other people you enjoy being around. Express your feelings and talk things over with someone you trust.  Counseling or talk therapy with a mental health professional may be helpful if you are having difficulty managing stress on your own. Medicine is typically not recommended for the treatment of stress. Talk to your health care provider if you think you need medicine for symptoms of stress.  HOME CARE INSTRUCTIONS  · Keep all follow-up visits as directed by your health care provider.  · Take all medicines as directed by your health care provider.  SEEK MEDICAL CARE IF:  · Your symptoms get worse or you start having new symptoms.  · You feel overwhelmed by your problems and can no longer manage them on your own.  SEEK IMMEDIATE MEDICAL CARE IF:  · You feel like hurting yourself or someone else.     This information is not intended to replace advice given to you by your health care provider. Make sure you discuss any questions you have with your health care provider.     Document Released: 06/13/2002 Document Revised: 04/10/2017 Document Reviewed: 08/12/2014  [employer] Interactive Patient Education ©2017 [employer]  Generalized Anxiety Disorder  Generalized anxiety disorder (JESSICA) is a mental disorder. It interferes with life functions, including relationships, work, and school.  JESSICA is different from normal anxiety, which everyone experiences at some point in their lives in response to specific life events and activities. Normal anxiety actually helps us prepare for and get through these life events and activities. Normal anxiety goes away after the event or activity is over.   JESSICA causes anxiety that is not necessarily related to specific events or activities. It also causes excess anxiety in proportion to specific events  or activities. The anxiety associated with JESSICA is also difficult to control. JESSICA can vary from mild to severe. People with severe JESSICA can have intense waves of anxiety with physical symptoms (panic attacks).   SYMPTOMS  The anxiety and worry associated with JESSICA are difficult to control. This anxiety and worry are related to many life events and activities and also occur more days than not for 6 months or longer. People with JESSICA also have three or more of the following symptoms (one or more in children):  · Restlessness.    · Fatigue.  · Difficulty concentrating.    · Irritability.  · Muscle tension.  · Difficulty sleeping or unsatisfying sleep.  DIAGNOSIS  JESSICA is diagnosed through an assessment by your health care provider. Your health care provider will ask you questions about your mood, physical symptoms, and events in your life. Your health care provider may ask you about your medical history and use of alcohol or drugs, including prescription medicines. Your health care provider may also do a physical exam and blood tests. Certain medical conditions and the use of certain substances can cause symptoms similar to those associated with JESSICA. Your health care provider may refer you to a mental health specialist for further evaluation.  TREATMENT  The following therapies are usually used to treat JESSICA:   · Medication. Antidepressant medication usually is prescribed for long-term daily control. Antianxiety medicines may be added in severe cases, especially when panic attacks occur.    · Talk therapy (psychotherapy). Certain types of talk therapy can be helpful in treating JESSICA by providing support, education, and guidance. A form of talk therapy called cognitive behavioral therapy can teach you healthy ways to think about and react to daily life events and activities.  · Stress management techniques. These include yoga, meditation, and exercise and can be very helpful when they are practiced regularly.  A mental health  specialist can help determine which treatment is best for you. Some people see improvement with one therapy. However, other people require a combination of therapies.     This information is not intended to replace advice given to you by your health care provider. Make sure you discuss any questions you have with your health care provider.     Document Released: 04/14/2014 Document Revised: 01/08/2016 Document Reviewed: 04/14/2014  [employer] Interactive Patient Education ©2017 [employer]    Major Depressive Disorder  Major depressive disorder is a mood disorder. It is not the same as feeling sad when bad things happen. This disorder can last for days or weeks. This disorder can make it hard to work or do things with family or friends. It can make a person feel:  · Sad.  · Empty.  · Hopeless.  · Helpless.  · Irritable.  In addition to these feelings, people who have this disorder have at least 4 of these symptoms:  · Having trouble sleeping.  · Sleeping too much.  · A big (major) change in appetite.  · A big change in weight.  · A lack of energy.  · Feelings of guilt or worthlessness.  · Having a hard time with concentrating, remembering, or making decisions.  · Slow movements.  · Restlessness.  · Thinking or dreaming about suicide or about harming oneself.  · Suicide attempt.  HOME CARE  · Take over-the-counter and prescription medicines only as told by your doctor.  · Keep all follow-up visits as told by your doctor. This includes any therapy that your doctor recommends for you.  · Lessen your stress. Do something that you enjoy, such as biking, walking, or reading a book.  · Exercise often.  · Eat a healthy diet.  · Do not drink alcohol.  · Do not take drugs.  · Get support from your family and friends.  GET HELP RIGHT AWAY IF:   · You start to hear voices.  · You see things that are not really there.  · You feel like people are out to get you (paranoid).  · You have serious thoughts about hurting yourself or  others.  · You think about suicide.     This information is not intended to replace advice given to you by your health care provider. Make sure you discuss any questions you have with your health care provider.     Document Released: 11/28/2016 Document Reviewed: 11/28/2016  [employer] Interactive Patient Education ©2017 [employer]

## 2017-09-27 ENCOUNTER — TREATMENT (OUTPATIENT)
Dept: CARDIAC REHAB | Facility: HOSPITAL | Age: 67
End: 2017-09-27

## 2017-09-27 VITALS — HEART RATE: 84 BPM | SYSTOLIC BLOOD PRESSURE: 152 MMHG | DIASTOLIC BLOOD PRESSURE: 82 MMHG

## 2017-09-27 DIAGNOSIS — Z95.5 STATUS POST INSERTION OF DRUG-ELUTING STENT INTO LEFT ANTERIOR DESCENDING (LAD) ARTERY FOR CORONARY ARTERY DISEASE: ICD-10-CM

## 2017-09-27 DIAGNOSIS — I21.4 NSTEMI (NON-ST ELEVATED MYOCARDIAL INFARCTION) (HCC): Primary | ICD-10-CM

## 2017-09-27 PROCEDURE — 93798 PHYS/QHP OP CAR RHAB W/ECG: CPT

## 2017-09-27 NOTE — PROGRESS NOTES
Phase II patient, see  [facility] documentation for exercise data documentation.  Dr. He physician immediately available. NAD note, skin w/p/d, denies CP, SOA, tolerated exercise well.

## 2017-09-29 ENCOUNTER — TREATMENT (OUTPATIENT)
Dept: CARDIAC REHAB | Facility: HOSPITAL | Age: 67
End: 2017-09-29

## 2017-09-29 VITALS — DIASTOLIC BLOOD PRESSURE: 76 MMHG | SYSTOLIC BLOOD PRESSURE: 134 MMHG | HEART RATE: 58 BPM

## 2017-09-29 DIAGNOSIS — I21.4 NSTEMI (NON-ST ELEVATED MYOCARDIAL INFARCTION) (HCC): Primary | ICD-10-CM

## 2017-09-29 DIAGNOSIS — Z95.5 STATUS POST INSERTION OF DRUG-ELUTING STENT INTO LEFT ANTERIOR DESCENDING (LAD) ARTERY FOR CORONARY ARTERY DISEASE: ICD-10-CM

## 2017-09-29 PROCEDURE — 93798 PHYS/QHP OP CAR RHAB W/ECG: CPT

## 2017-10-02 ENCOUNTER — TREATMENT (OUTPATIENT)
Dept: CARDIAC REHAB | Facility: HOSPITAL | Age: 67
End: 2017-10-02

## 2017-10-02 ENCOUNTER — APPOINTMENT (OUTPATIENT)
Dept: CARDIAC REHAB | Facility: HOSPITAL | Age: 67
End: 2017-10-02

## 2017-10-02 VITALS — HEART RATE: 52 BPM | SYSTOLIC BLOOD PRESSURE: 130 MMHG | DIASTOLIC BLOOD PRESSURE: 74 MMHG

## 2017-10-02 DIAGNOSIS — Z95.5 STATUS POST INSERTION OF DRUG-ELUTING STENT INTO LEFT ANTERIOR DESCENDING (LAD) ARTERY FOR CORONARY ARTERY DISEASE: Primary | ICD-10-CM

## 2017-10-02 PROCEDURE — 93798 PHYS/QHP OP CAR RHAB W/ECG: CPT

## 2017-10-03 ENCOUNTER — APPOINTMENT (OUTPATIENT)
Dept: CARDIAC REHAB | Facility: HOSPITAL | Age: 67
End: 2017-10-03

## 2017-10-04 ENCOUNTER — TREATMENT (OUTPATIENT)
Dept: CARDIAC REHAB | Facility: HOSPITAL | Age: 67
End: 2017-10-04

## 2017-10-04 ENCOUNTER — TELEPHONE (OUTPATIENT)
Dept: CARDIOLOGY | Facility: CLINIC | Age: 67
End: 2017-10-04

## 2017-10-04 VITALS — DIASTOLIC BLOOD PRESSURE: 64 MMHG | HEART RATE: 54 BPM | SYSTOLIC BLOOD PRESSURE: 140 MMHG

## 2017-10-04 DIAGNOSIS — I21.4 NSTEMI (NON-ST ELEVATED MYOCARDIAL INFARCTION) (HCC): ICD-10-CM

## 2017-10-04 DIAGNOSIS — Z95.5 STATUS POST INSERTION OF DRUG-ELUTING STENT INTO LEFT ANTERIOR DESCENDING (LAD) ARTERY FOR CORONARY ARTERY DISEASE: Primary | ICD-10-CM

## 2017-10-04 PROCEDURE — 93798 PHYS/QHP OP CAR RHAB W/ECG: CPT

## 2017-10-04 NOTE — TELEPHONE ENCOUNTER
Results of carotid US reviewed with the patient.  All questions and concerns addressed at this time.  Understanding verbalized.

## 2017-10-06 ENCOUNTER — APPOINTMENT (OUTPATIENT)
Dept: CARDIAC REHAB | Facility: HOSPITAL | Age: 67
End: 2017-10-06

## 2017-10-09 ENCOUNTER — TREATMENT (OUTPATIENT)
Dept: CARDIAC REHAB | Facility: HOSPITAL | Age: 67
End: 2017-10-09

## 2017-10-09 VITALS — SYSTOLIC BLOOD PRESSURE: 140 MMHG | HEART RATE: 65 BPM | DIASTOLIC BLOOD PRESSURE: 80 MMHG | OXYGEN SATURATION: 100 %

## 2017-10-09 DIAGNOSIS — I21.4 NSTEMI (NON-ST ELEVATED MYOCARDIAL INFARCTION) (HCC): ICD-10-CM

## 2017-10-09 DIAGNOSIS — Z95.5 STATUS POST INSERTION OF DRUG-ELUTING STENT INTO LEFT ANTERIOR DESCENDING (LAD) ARTERY FOR CORONARY ARTERY DISEASE: Primary | ICD-10-CM

## 2017-10-09 PROCEDURE — 93798 PHYS/QHP OP CAR RHAB W/ECG: CPT

## 2017-10-09 NOTE — PROGRESS NOTES
Phase II patient, see  [place] documentation for exercise data documentation.  Dr. Emery  physician immediately available. NAD note, skin w/p/d, denies CP, SOA, tolerated exercise well.

## 2017-10-11 ENCOUNTER — APPOINTMENT (OUTPATIENT)
Dept: CARDIAC REHAB | Facility: HOSPITAL | Age: 67
End: 2017-10-11

## 2017-10-11 ENCOUNTER — TREATMENT (OUTPATIENT)
Dept: CARDIAC REHAB | Facility: HOSPITAL | Age: 67
End: 2017-10-11

## 2017-10-11 VITALS — SYSTOLIC BLOOD PRESSURE: 114 MMHG | HEART RATE: 60 BPM | DIASTOLIC BLOOD PRESSURE: 70 MMHG

## 2017-10-11 DIAGNOSIS — Z95.5 STATUS POST INSERTION OF DRUG-ELUTING STENT INTO LEFT ANTERIOR DESCENDING (LAD) ARTERY FOR CORONARY ARTERY DISEASE: Primary | ICD-10-CM

## 2017-10-11 DIAGNOSIS — I21.4 NSTEMI (NON-ST ELEVATED MYOCARDIAL INFARCTION) (HCC): ICD-10-CM

## 2017-10-11 PROCEDURE — 93798 PHYS/QHP OP CAR RHAB W/ECG: CPT

## 2017-10-11 NOTE — PROGRESS NOTES
Phase II patient, see  [place] documentation for exercise data documentation.  Dr. Emery  physician immediately available. NAD note, skin w/p/d, denies CP, SOA, tolerated exercise well.

## 2017-10-13 ENCOUNTER — APPOINTMENT (OUTPATIENT)
Dept: CARDIAC REHAB | Facility: HOSPITAL | Age: 67
End: 2017-10-13

## 2017-10-16 ENCOUNTER — APPOINTMENT (OUTPATIENT)
Dept: CARDIAC REHAB | Facility: HOSPITAL | Age: 67
End: 2017-10-16

## 2017-10-18 ENCOUNTER — TREATMENT (OUTPATIENT)
Dept: CARDIAC REHAB | Facility: HOSPITAL | Age: 67
End: 2017-10-18

## 2017-10-18 ENCOUNTER — DOCUMENTATION (OUTPATIENT)
Dept: CARDIAC REHAB | Facility: HOSPITAL | Age: 67
End: 2017-10-18

## 2017-10-18 ENCOUNTER — APPOINTMENT (OUTPATIENT)
Dept: CARDIAC REHAB | Facility: HOSPITAL | Age: 67
End: 2017-10-18

## 2017-10-18 VITALS — DIASTOLIC BLOOD PRESSURE: 84 MMHG | SYSTOLIC BLOOD PRESSURE: 154 MMHG | HEART RATE: 60 BPM

## 2017-10-18 DIAGNOSIS — Z95.5 STATUS POST INSERTION OF DRUG-ELUTING STENT INTO LEFT ANTERIOR DESCENDING (LAD) ARTERY FOR CORONARY ARTERY DISEASE: ICD-10-CM

## 2017-10-18 DIAGNOSIS — I21.4 NSTEMI (NON-ST ELEVATED MYOCARDIAL INFARCTION) (HCC): Primary | ICD-10-CM

## 2017-10-18 PROCEDURE — 93798 PHYS/QHP OP CAR RHAB W/ECG: CPT

## 2017-10-18 NOTE — PROGRESS NOTES
Phase II patient, see  [facility] documentation for exercise data documentation.  Dr. Garcia physician immediately available. NAD note, skin w/p/d, denies CP, SOA, tolerated exercise well. Educated on anxiety, depression, and stress management.  States Dr. Martinez started her on medication for anxiety and depression     BP has been slightly elevated.  Progress report and home BP journal faxed to Dr. Martinez office.  Called and notified MA of fax being sent, states will place in pt's chart for Dr. Martinez to review during tomorrows appt.

## 2017-10-18 NOTE — PATIENT INSTRUCTIONS
Stress and Stress Management  Stress is a normal reaction to life events. It is what you feel when life demands more than you are used to or more than you can handle. Some stress can be useful. For example, the stress reaction can help you catch the last bus of the day, study for a test, or meet a deadline at work. But stress that occurs too often or for too long can cause problems. It can affect your emotional health and interfere with relationships and normal daily activities. Too much stress can weaken your immune system and increase your risk for physical illness. If you already have a medical problem, stress can make it worse.  CAUSES   All sorts of life events may cause stress. An event that causes stress for one person may not be stressful for another person. Major life events commonly cause stress. These may be positive or negative. Examples include losing your job, moving into a new home, getting , having a baby, or losing a loved one. Less obvious life events may also cause stress, especially if they occur day after day or in combination. Examples include working long hours, driving in traffic, caring for children, being in debt, or being in a difficult relationship.  SIGNS AND SYMPTOMS  Stress may cause emotional symptoms including, the following:  · Anxiety. This is feeling worried, afraid, on edge, overwhelmed, or out of control.  · Anger. This is feeling irritated or impatient.  · Depression. This is feeling sad, down, helpless, or guilty.  · Difficulty focusing, remembering, or making decisions.  Stress may cause physical symptoms, including the following:   · Aches and pains. These may affect your head, neck, back, stomach, or other areas of your body.  · Tight muscles or clenched jaw.  · Low energy or trouble sleeping.   Stress may cause unhealthy behaviors, including the following:   · Eating to feel better (overeating) or skipping meals.  · Sleeping too little, too much, or both.  · Working  "too much or putting off tasks (procrastination).  · Smoking, drinking alcohol, or using drugs to feel better.  DIAGNOSIS   Stress is diagnosed through an assessment by your health care provider. Your health care provider will ask questions about your symptoms and any stressful life events. Your health care provider will also ask about your medical history and may order blood tests or other tests. Certain medical conditions and medicine can cause physical symptoms similar to stress.  Mental illness can cause emotional symptoms and unhealthy behaviors similar to stress. Your health care provider may refer you to a mental health professional for further evaluation.   TREATMENT   Stress management is the recommended treatment for stress. The goals of stress management are reducing stressful life events and coping with stress in healthy ways.   Techniques for reducing stressful life events include the following:  · Stress identification. Self-monitor for stress and identify what causes stress for you. These skills may help you to avoid some stressful events.  · Time management. Set your priorities, keep a calendar of events, and learn to say \"no.\" These tools can help you avoid making too many commitments.  Techniques for coping with stress include the following:  · Rethinking the problem. Try to think realistically about stressful events rather than ignoring them or overreacting. Try to find the positives in a stressful situation rather than focusing on the negatives.  · Exercise. Physical exercise can release both physical and emotional tension. The key is to find a form of exercise you enjoy and do it regularly.  · Relaxation techniques. These relax the body and mind. Examples include yoga, meditation, phani chi, biofeedback, deep breathing, progressive muscle relaxation, listening to music, being out in nature, journaling, and other hobbies. Again, the key is to find one or more that you enjoy and can do " regularly.  · Healthy lifestyle. Eat a balanced diet, get plenty of sleep, and do not smoke. Avoid using alcohol or drugs to relax.  · Strong support network. Spend time with family, friends, or other people you enjoy being around. Express your feelings and talk things over with someone you trust.  Counseling or talk therapy with a mental health professional may be helpful if you are having difficulty managing stress on your own. Medicine is typically not recommended for the treatment of stress. Talk to your health care provider if you think you need medicine for symptoms of stress.  HOME CARE INSTRUCTIONS  · Keep all follow-up visits as directed by your health care provider.  · Take all medicines as directed by your health care provider.  SEEK MEDICAL CARE IF:  · Your symptoms get worse or you start having new symptoms.  · You feel overwhelmed by your problems and can no longer manage them on your own.  SEEK IMMEDIATE MEDICAL CARE IF:  · You feel like hurting yourself or someone else.     This information is not intended to replace advice given to you by your health care provider. Make sure you discuss any questions you have with your health care provider.     Document Released: 06/13/2002 Document Revised: 04/10/2017 Document Reviewed: 08/12/2014  [employer] Interactive Patient Education ©2017 [employer]    Social Anxiety Disorder  Social anxiety disorder, previously called social phobia, is a mental disorder. People with social anxiety disorder frequently feel nervous, afraid, or embarrassed when around other people in social situations. They constantly worry that other people are judging or criticizing them for how they look, what they say, or how they act. They may worry that other people might reject them because of their appearance or behavior.  Social anxiety disorder is more than just occasional shyness or self-consciousness. It can cause severe emotional distress. It can interfere with daily life  activities. Social anxiety disorder also may lead to excessive alcohol or drug use and even suicide.   Social anxiety disorder is actually one of the most common mental disorders. It can develop at any time but usually starts in the teenage years. Women are more commonly affected than men. Social anxiety disorder is also more common in people who have family members with anxiety disorders. It also is more common in people who have physical deformities or conditions with characteristics that are obvious to others, such as stuttered speech or movement abnormalities (Parkinson disease).   SYMPTOMS   In addition to feeling anxious or fearful in social situations, people with social anxiety disorder frequently have physical symptoms. Examples include:  · Red face (blushing).  · Racing heart.  · Sweating.  · Shaky hands or voice.  · Confusion.  · Light-headedness.  · Upset stomach and diarrhea.  DIAGNOSIS   Social anxiety disorder is diagnosed through an assessment by your health care provider. Your health care provider will ask you questions about your mood, thoughts, and reactions in social situations. Your health care provider may ask you about your medical history and use of alcohol or drugs, including prescription medicines. Certain medical conditions and the use of certain substances, including caffeine, can cause symptoms similar to social anxiety disorder. Your health care provider may refer you to a mental health specialist for further evaluation or treatment.  The criteria for diagnosis of social anxiety disorder are:  · Marked fear or anxiety in one or more social situations in which you may be closely watched or studied by others. Examples of such situations include:    Interacting socially (having a conversation with others, going to a party, or meeting strangers).    Being observed (eating or drinking in public or being called on in class).    Performing in front of others (giving a speech).  · The social  situations of concern almost always cause fear or anxiety, not just occasionally.  · People with social anxiety disorder fear that they will be viewed negatively in a way that will be embarrassing, will lead to rejection, or will offend others. This fear is out of proportion to the actual threat posed by the social situation.  · Often the triggering social situations are avoided, or they are endured with intense fear or anxiety. The fear, anxiety, or avoidance is persistent and lasts for 6 months or longer.  · The anxiety causes difficulty functioning in at least some parts of your daily life.  TREATMENT   Several types of treatment are available for social anxiety disorder. These treatments are often used in combination and include:   · Talk therapy. Group talk therapy allows you to see that you are not alone with these problems. Individual talk therapy helps you address your specific anxiety issues with a caring professional. The most effective forms of talk therapy for social anxiety disorder are cognitive-behavioral therapy and exposure therapy. Cognitive-behavioral therapy helps you to identify and change negative thoughts and beliefs that are at the root of the disorder. Exposure therapy allows you to gradually face the situations that you fear most.  · Relaxation and coping techniques. These include deep breathing, self-talk, meditation, visual imagery, and yoga. Relaxation techniques help to keep you calm in social situations.  · Social skills training. Social skills can be learned on your own or with the help of a talk therapist. They can help you feel more confident and comfortable in social situations.  · Medicine. For anxiety limited to performance situations (performance anxiety), medicine called beta blockers can help by reducing or preventing the physical symptoms of social anxiety disorder. For more persistent and generalized social anxiety, antidepressant medicine may be prescribed to help control  symptoms. In severe cases of social anxiety disorder, strong antianxiety medicine, called benzodiazepines, may be prescribed on a limited basis and for a short time.     This information is not intended to replace advice given to you by your health care provider. Make sure you discuss any questions you have with your health care provider.     Document Released: 11/16/2006 Document Revised: 04/10/2017 Document Reviewed: 03/18/2014  [employer] Interactive Patient Education ©2017 [employer]

## 2017-10-19 ENCOUNTER — TELEPHONE (OUTPATIENT)
Dept: CARDIAC REHAB | Facility: HOSPITAL | Age: 67
End: 2017-10-19

## 2017-10-19 NOTE — PROGRESS NOTES
Called Dr. Martinez office to s/w MA about pt's BP.  Pt has appt tomorrow, BP journal and progress report sent to Dr. Martinez.

## 2017-10-20 ENCOUNTER — APPOINTMENT (OUTPATIENT)
Dept: CARDIAC REHAB | Facility: HOSPITAL | Age: 67
End: 2017-10-20

## 2017-10-20 ENCOUNTER — TREATMENT (OUTPATIENT)
Dept: CARDIAC REHAB | Facility: HOSPITAL | Age: 67
End: 2017-10-20

## 2017-10-20 VITALS — HEART RATE: 52 BPM | OXYGEN SATURATION: 100 % | DIASTOLIC BLOOD PRESSURE: 72 MMHG | SYSTOLIC BLOOD PRESSURE: 142 MMHG

## 2017-10-20 DIAGNOSIS — I21.4 NSTEMI (NON-ST ELEVATED MYOCARDIAL INFARCTION) (HCC): Primary | ICD-10-CM

## 2017-10-20 DIAGNOSIS — Z95.5 STATUS POST INSERTION OF DRUG-ELUTING STENT INTO LEFT ANTERIOR DESCENDING (LAD) ARTERY FOR CORONARY ARTERY DISEASE: ICD-10-CM

## 2017-10-20 PROCEDURE — 93798 PHYS/QHP OP CAR RHAB W/ECG: CPT

## 2017-10-20 NOTE — PROGRESS NOTES
Phase II patient, see  [place] documentation for exercise data documentation.  Dr. Garcia  physician immediately available. NAD note, skin w/p/d, denies CP, SOA, tolerated exercise well.

## 2017-10-23 ENCOUNTER — APPOINTMENT (OUTPATIENT)
Dept: CARDIAC REHAB | Facility: HOSPITAL | Age: 67
End: 2017-10-23

## 2017-10-23 ENCOUNTER — TREATMENT (OUTPATIENT)
Dept: CARDIAC REHAB | Facility: HOSPITAL | Age: 67
End: 2017-10-23

## 2017-10-23 VITALS — OXYGEN SATURATION: 100 % | SYSTOLIC BLOOD PRESSURE: 158 MMHG | DIASTOLIC BLOOD PRESSURE: 72 MMHG | HEART RATE: 59 BPM

## 2017-10-23 DIAGNOSIS — I21.4 NSTEMI (NON-ST ELEVATED MYOCARDIAL INFARCTION) (HCC): Primary | ICD-10-CM

## 2017-10-23 DIAGNOSIS — Z95.5 STATUS POST INSERTION OF DRUG-ELUTING STENT INTO LEFT ANTERIOR DESCENDING (LAD) ARTERY FOR CORONARY ARTERY DISEASE: ICD-10-CM

## 2017-10-23 PROCEDURE — 93798 PHYS/QHP OP CAR RHAB W/ECG: CPT

## 2017-10-23 NOTE — PROGRESS NOTES
Phase II patient, see  [place] documentation for exercise data documentation.  Dr. Emery  physician immediately available. NAD note, skin w/p/d, denies CP, SOA, tolerated exercise well.

## 2017-10-25 ENCOUNTER — APPOINTMENT (OUTPATIENT)
Dept: CARDIAC REHAB | Facility: HOSPITAL | Age: 67
End: 2017-10-25

## 2017-10-25 ENCOUNTER — TREATMENT (OUTPATIENT)
Dept: CARDIAC REHAB | Facility: HOSPITAL | Age: 67
End: 2017-10-25

## 2017-10-25 VITALS — HEART RATE: 49 BPM | SYSTOLIC BLOOD PRESSURE: 144 MMHG | DIASTOLIC BLOOD PRESSURE: 80 MMHG

## 2017-10-25 DIAGNOSIS — Z95.5 STATUS POST INSERTION OF DRUG-ELUTING STENT INTO LEFT ANTERIOR DESCENDING (LAD) ARTERY FOR CORONARY ARTERY DISEASE: ICD-10-CM

## 2017-10-25 DIAGNOSIS — I21.4 NSTEMI (NON-ST ELEVATED MYOCARDIAL INFARCTION) (HCC): Primary | ICD-10-CM

## 2017-10-25 PROCEDURE — 93798 PHYS/QHP OP CAR RHAB W/ECG: CPT

## 2017-10-25 NOTE — PROGRESS NOTES
Phase II patient, see  [place] documentation for exercise data documentation.  Dr. Emery  physician immediately available. NAD note, skin w/p/d, denies CP, SOA, tolerated exercise well.

## 2017-10-27 ENCOUNTER — APPOINTMENT (OUTPATIENT)
Dept: CARDIAC REHAB | Facility: HOSPITAL | Age: 67
End: 2017-10-27

## 2017-10-27 ENCOUNTER — TREATMENT (OUTPATIENT)
Dept: CARDIAC REHAB | Facility: HOSPITAL | Age: 67
End: 2017-10-27

## 2017-10-27 VITALS — DIASTOLIC BLOOD PRESSURE: 80 MMHG | HEART RATE: 65 BPM | SYSTOLIC BLOOD PRESSURE: 138 MMHG

## 2017-10-27 DIAGNOSIS — I21.4 NSTEMI (NON-ST ELEVATED MYOCARDIAL INFARCTION) (HCC): Primary | ICD-10-CM

## 2017-10-27 PROCEDURE — 93798 PHYS/QHP OP CAR RHAB W/ECG: CPT

## 2017-10-27 NOTE — PROGRESS NOTES
Phase II patient, see  [place] documentation for exercise data documentation.  Dr. Emery  physician immediately available. NAD note, skin w/p/d, denies CP, SOA, tolerated exercise well.

## 2017-10-30 ENCOUNTER — APPOINTMENT (OUTPATIENT)
Dept: CARDIAC REHAB | Facility: HOSPITAL | Age: 67
End: 2017-10-30

## 2017-10-30 ENCOUNTER — TREATMENT (OUTPATIENT)
Dept: CARDIAC REHAB | Facility: HOSPITAL | Age: 67
End: 2017-10-30

## 2017-10-30 VITALS — SYSTOLIC BLOOD PRESSURE: 142 MMHG | DIASTOLIC BLOOD PRESSURE: 70 MMHG | HEART RATE: 51 BPM

## 2017-10-30 DIAGNOSIS — I21.4 NSTEMI (NON-ST ELEVATED MYOCARDIAL INFARCTION) (HCC): Primary | ICD-10-CM

## 2017-10-30 PROCEDURE — 93798 PHYS/QHP OP CAR RHAB W/ECG: CPT

## 2017-10-30 NOTE — PROGRESS NOTES
Phase II patient, see  [place] documentation for exercise data documentation.  Dr. Garcia physician immediately available. NAD note, skin w/p/d, denies CP, SOA, tolerated exercise well.

## 2017-11-01 ENCOUNTER — APPOINTMENT (OUTPATIENT)
Dept: CARDIAC REHAB | Facility: HOSPITAL | Age: 67
End: 2017-11-01

## 2017-11-01 ENCOUNTER — TREATMENT (OUTPATIENT)
Dept: CARDIAC REHAB | Facility: HOSPITAL | Age: 67
End: 2017-11-01

## 2017-11-01 VITALS — HEART RATE: 54 BPM | DIASTOLIC BLOOD PRESSURE: 84 MMHG | SYSTOLIC BLOOD PRESSURE: 138 MMHG

## 2017-11-01 DIAGNOSIS — Z95.5 STATUS POST INSERTION OF DRUG-ELUTING STENT INTO LEFT ANTERIOR DESCENDING (LAD) ARTERY FOR CORONARY ARTERY DISEASE: ICD-10-CM

## 2017-11-01 DIAGNOSIS — I21.4 NSTEMI (NON-ST ELEVATED MYOCARDIAL INFARCTION) (HCC): Primary | ICD-10-CM

## 2017-11-01 PROCEDURE — 93798 PHYS/QHP OP CAR RHAB W/ECG: CPT

## 2017-11-01 NOTE — PROGRESS NOTES
Phase II patient, see  [place] documentation for exercise data documentation.  Dr. Garcia physician immediately available. NAD note, skin w/p/d, denies CP, SOA, tolerated exercise well.

## 2017-11-02 ENCOUNTER — TRANSCRIBE ORDERS (OUTPATIENT)
Dept: ADMINISTRATIVE | Facility: HOSPITAL | Age: 67
End: 2017-11-02

## 2017-11-02 DIAGNOSIS — Z78.0 POSTMENOPAUSAL: Primary | ICD-10-CM

## 2017-11-03 ENCOUNTER — APPOINTMENT (OUTPATIENT)
Dept: CARDIAC REHAB | Facility: HOSPITAL | Age: 67
End: 2017-11-03

## 2017-11-06 ENCOUNTER — TREATMENT (OUTPATIENT)
Dept: CARDIAC REHAB | Facility: HOSPITAL | Age: 67
End: 2017-11-06

## 2017-11-06 ENCOUNTER — APPOINTMENT (OUTPATIENT)
Dept: CARDIAC REHAB | Facility: HOSPITAL | Age: 67
End: 2017-11-06

## 2017-11-06 VITALS — OXYGEN SATURATION: 100 % | HEART RATE: 51 BPM | DIASTOLIC BLOOD PRESSURE: 72 MMHG | SYSTOLIC BLOOD PRESSURE: 160 MMHG

## 2017-11-06 DIAGNOSIS — I21.4 NSTEMI (NON-ST ELEVATED MYOCARDIAL INFARCTION) (HCC): Primary | ICD-10-CM

## 2017-11-06 DIAGNOSIS — Z95.5 STATUS POST INSERTION OF DRUG-ELUTING STENT INTO LEFT ANTERIOR DESCENDING (LAD) ARTERY FOR CORONARY ARTERY DISEASE: ICD-10-CM

## 2017-11-06 PROCEDURE — 93798 PHYS/QHP OP CAR RHAB W/ECG: CPT

## 2017-11-06 NOTE — PROGRESS NOTES
Phase II patient, see  [place] documentation for exercise data documentation.  Dr. Garcia  physician immediately available. NAD note, skin w/p/d, denies CP, SOA, tolerated exercise well.     Spoke with patient about her blood pressure, she had went to see Dr. Martinez about it. I had faxed a blood pressure record to his office before her appointment. She stated They increased her Lisinopril and Coreg dosages.  She has is to go back in two weeks.

## 2017-11-08 ENCOUNTER — TREATMENT (OUTPATIENT)
Dept: CARDIAC REHAB | Facility: HOSPITAL | Age: 67
End: 2017-11-08

## 2017-11-08 ENCOUNTER — APPOINTMENT (OUTPATIENT)
Dept: CARDIAC REHAB | Facility: HOSPITAL | Age: 67
End: 2017-11-08

## 2017-11-08 VITALS — SYSTOLIC BLOOD PRESSURE: 114 MMHG | DIASTOLIC BLOOD PRESSURE: 70 MMHG

## 2017-11-08 DIAGNOSIS — Z95.5 STATUS POST INSERTION OF DRUG-ELUTING STENT INTO LEFT ANTERIOR DESCENDING (LAD) ARTERY FOR CORONARY ARTERY DISEASE: ICD-10-CM

## 2017-11-08 DIAGNOSIS — I21.4 NSTEMI (NON-ST ELEVATED MYOCARDIAL INFARCTION) (HCC): Primary | ICD-10-CM

## 2017-11-08 PROCEDURE — 93798 PHYS/QHP OP CAR RHAB W/ECG: CPT

## 2017-11-08 NOTE — PROGRESS NOTES
Phase II patient, see  [place] documentation for exercise data documentation.  Dr. Garcia physician immediately available. NAD note, skin w/p/d, denies CP, SOA, tolerated exercise well.

## 2017-11-10 ENCOUNTER — APPOINTMENT (OUTPATIENT)
Dept: CARDIAC REHAB | Facility: HOSPITAL | Age: 67
End: 2017-11-10

## 2017-11-10 ENCOUNTER — TREATMENT (OUTPATIENT)
Dept: CARDIAC REHAB | Facility: HOSPITAL | Age: 67
End: 2017-11-10

## 2017-11-10 VITALS — HEART RATE: 56 BPM | DIASTOLIC BLOOD PRESSURE: 80 MMHG | SYSTOLIC BLOOD PRESSURE: 132 MMHG

## 2017-11-10 DIAGNOSIS — I21.4 NSTEMI (NON-ST ELEVATED MYOCARDIAL INFARCTION) (HCC): Primary | ICD-10-CM

## 2017-11-10 PROCEDURE — 93798 PHYS/QHP OP CAR RHAB W/ECG: CPT

## 2017-11-10 NOTE — PROGRESS NOTES
Phase II patient, see  [place] documentation for exercise data documentation.  Dr. Garcia physician immediately available. NAD note, skin w/p/d, denies CP, SOA, tolerated exercise well.

## 2017-11-13 ENCOUNTER — APPOINTMENT (OUTPATIENT)
Dept: CARDIAC REHAB | Facility: HOSPITAL | Age: 67
End: 2017-11-13

## 2017-11-15 ENCOUNTER — APPOINTMENT (OUTPATIENT)
Dept: CARDIAC REHAB | Facility: HOSPITAL | Age: 67
End: 2017-11-15

## 2017-11-15 ENCOUNTER — TREATMENT (OUTPATIENT)
Dept: CARDIAC REHAB | Facility: HOSPITAL | Age: 67
End: 2017-11-15

## 2017-11-15 VITALS — DIASTOLIC BLOOD PRESSURE: 60 MMHG | HEART RATE: 60 BPM | SYSTOLIC BLOOD PRESSURE: 114 MMHG

## 2017-11-15 DIAGNOSIS — I21.4 NSTEMI (NON-ST ELEVATED MYOCARDIAL INFARCTION) (HCC): Primary | ICD-10-CM

## 2017-11-15 DIAGNOSIS — Z95.5 STATUS POST INSERTION OF DRUG-ELUTING STENT INTO LEFT ANTERIOR DESCENDING (LAD) ARTERY FOR CORONARY ARTERY DISEASE: ICD-10-CM

## 2017-11-15 PROCEDURE — 93798 PHYS/QHP OP CAR RHAB W/ECG: CPT

## 2017-11-15 NOTE — PROGRESS NOTES
Phase II patient, see  [place] documentation for exercise data documentation.  NAD note, skin w/p/d, denies CP, SOA, tolerated exercise well.

## 2017-11-16 ENCOUNTER — DOCUMENTATION (OUTPATIENT)
Dept: CARDIAC REHAB | Facility: HOSPITAL | Age: 67
End: 2017-11-16

## 2017-11-16 NOTE — PROGRESS NOTES
Migdalia with Dr. Martinez office to verify fax number and let them know we were faxing information on patient for Dr. Martinez to review

## 2017-11-17 ENCOUNTER — TREATMENT (OUTPATIENT)
Dept: CARDIAC REHAB | Facility: HOSPITAL | Age: 67
End: 2017-11-17

## 2017-11-17 ENCOUNTER — APPOINTMENT (OUTPATIENT)
Dept: CARDIAC REHAB | Facility: HOSPITAL | Age: 67
End: 2017-11-17

## 2017-11-17 VITALS — HEART RATE: 52 BPM | OXYGEN SATURATION: 98 % | SYSTOLIC BLOOD PRESSURE: 140 MMHG | DIASTOLIC BLOOD PRESSURE: 66 MMHG

## 2017-11-17 DIAGNOSIS — Z95.5 STATUS POST INSERTION OF DRUG-ELUTING STENT INTO LEFT ANTERIOR DESCENDING (LAD) ARTERY FOR CORONARY ARTERY DISEASE: ICD-10-CM

## 2017-11-17 DIAGNOSIS — I21.4 NSTEMI (NON-ST ELEVATED MYOCARDIAL INFARCTION) (HCC): Primary | ICD-10-CM

## 2017-11-17 PROCEDURE — 93798 PHYS/QHP OP CAR RHAB W/ECG: CPT

## 2017-11-17 NOTE — PROGRESS NOTES
Phase II patient, see  [place] documentation for exercise data documentation.  Dr. Agosto  physician immediately available. NAD note, skin w/p/d, denies CP, SOA, tolerated exercise well.

## 2017-11-20 ENCOUNTER — TREATMENT (OUTPATIENT)
Dept: CARDIAC REHAB | Facility: HOSPITAL | Age: 67
End: 2017-11-20

## 2017-11-20 ENCOUNTER — APPOINTMENT (OUTPATIENT)
Dept: CARDIAC REHAB | Facility: HOSPITAL | Age: 67
End: 2017-11-20

## 2017-11-20 VITALS — SYSTOLIC BLOOD PRESSURE: 138 MMHG | DIASTOLIC BLOOD PRESSURE: 68 MMHG | OXYGEN SATURATION: 98 % | HEART RATE: 57 BPM

## 2017-11-20 DIAGNOSIS — I21.4 NSTEMI (NON-ST ELEVATED MYOCARDIAL INFARCTION) (HCC): Primary | ICD-10-CM

## 2017-11-20 DIAGNOSIS — Z95.5 STATUS POST INSERTION OF DRUG-ELUTING STENT INTO LEFT ANTERIOR DESCENDING (LAD) ARTERY FOR CORONARY ARTERY DISEASE: ICD-10-CM

## 2017-11-20 PROCEDURE — 93798 PHYS/QHP OP CAR RHAB W/ECG: CPT

## 2017-11-20 NOTE — PROGRESS NOTES
Phase II patient, see  [place] documentation for exercise data documentation.    physician immediately available. NAD note, skin w/p/d, denies CP, SOA, tolerated exercise well.

## 2017-11-22 ENCOUNTER — APPOINTMENT (OUTPATIENT)
Dept: CARDIAC REHAB | Facility: HOSPITAL | Age: 67
End: 2017-11-22

## 2017-11-24 ENCOUNTER — APPOINTMENT (OUTPATIENT)
Dept: CARDIAC REHAB | Facility: HOSPITAL | Age: 67
End: 2017-11-24

## 2017-11-27 ENCOUNTER — HOSPITAL ENCOUNTER (OUTPATIENT)
Dept: BONE DENSITY | Facility: HOSPITAL | Age: 67
Discharge: HOME | End: 2017-11-27
Admitting: NURSE PRACTITIONER

## 2017-11-27 ENCOUNTER — APPOINTMENT (OUTPATIENT)
Dept: CARDIAC REHAB | Facility: HOSPITAL | Age: 67
End: 2017-11-27

## 2017-11-27 ENCOUNTER — TREATMENT (OUTPATIENT)
Dept: CARDIAC REHAB | Facility: HOSPITAL | Age: 67
End: 2017-11-27

## 2017-11-27 VITALS — HEART RATE: 60 BPM | SYSTOLIC BLOOD PRESSURE: 114 MMHG | DIASTOLIC BLOOD PRESSURE: 78 MMHG

## 2017-11-27 DIAGNOSIS — Z78.0 POSTMENOPAUSAL: ICD-10-CM

## 2017-11-27 DIAGNOSIS — I21.4 NSTEMI (NON-ST ELEVATED MYOCARDIAL INFARCTION) (HCC): Primary | ICD-10-CM

## 2017-11-27 PROCEDURE — 77080 DXA BONE DENSITY AXIAL: CPT | Performed by: RADIOLOGY

## 2017-11-27 PROCEDURE — 93798 PHYS/QHP OP CAR RHAB W/ECG: CPT

## 2017-11-27 PROCEDURE — 77080 DXA BONE DENSITY AXIAL: CPT

## 2017-11-29 ENCOUNTER — APPOINTMENT (OUTPATIENT)
Dept: CARDIAC REHAB | Facility: HOSPITAL | Age: 67
End: 2017-11-29

## 2017-11-29 ENCOUNTER — TREATMENT (OUTPATIENT)
Dept: CARDIAC REHAB | Facility: HOSPITAL | Age: 67
End: 2017-11-29

## 2017-11-29 VITALS — DIASTOLIC BLOOD PRESSURE: 78 MMHG | SYSTOLIC BLOOD PRESSURE: 158 MMHG | HEART RATE: 52 BPM

## 2017-11-29 DIAGNOSIS — Z95.5 STATUS POST INSERTION OF DRUG-ELUTING STENT INTO LEFT ANTERIOR DESCENDING (LAD) ARTERY FOR CORONARY ARTERY DISEASE: Primary | ICD-10-CM

## 2017-11-29 DIAGNOSIS — I21.4 NSTEMI (NON-ST ELEVATED MYOCARDIAL INFARCTION) (HCC): ICD-10-CM

## 2017-11-29 PROCEDURE — 93798 PHYS/QHP OP CAR RHAB W/ECG: CPT

## 2017-12-01 ENCOUNTER — APPOINTMENT (OUTPATIENT)
Dept: CARDIAC REHAB | Facility: HOSPITAL | Age: 67
End: 2017-12-01

## 2017-12-04 ENCOUNTER — APPOINTMENT (OUTPATIENT)
Dept: CARDIAC REHAB | Facility: HOSPITAL | Age: 67
End: 2017-12-04

## 2017-12-06 ENCOUNTER — APPOINTMENT (OUTPATIENT)
Dept: CARDIAC REHAB | Facility: HOSPITAL | Age: 67
End: 2017-12-06

## 2017-12-08 ENCOUNTER — APPOINTMENT (OUTPATIENT)
Dept: CARDIAC REHAB | Facility: HOSPITAL | Age: 67
End: 2017-12-08

## 2017-12-11 ENCOUNTER — APPOINTMENT (OUTPATIENT)
Dept: CARDIAC REHAB | Facility: HOSPITAL | Age: 67
End: 2017-12-11

## 2017-12-13 ENCOUNTER — TREATMENT (OUTPATIENT)
Dept: CARDIAC REHAB | Facility: HOSPITAL | Age: 67
End: 2017-12-13

## 2017-12-13 ENCOUNTER — APPOINTMENT (OUTPATIENT)
Dept: CARDIAC REHAB | Facility: HOSPITAL | Age: 67
End: 2017-12-13

## 2017-12-13 VITALS — HEART RATE: 55 BPM | OXYGEN SATURATION: 98 % | DIASTOLIC BLOOD PRESSURE: 78 MMHG | SYSTOLIC BLOOD PRESSURE: 140 MMHG

## 2017-12-13 DIAGNOSIS — Z95.5 STATUS POST INSERTION OF DRUG-ELUTING STENT INTO LEFT ANTERIOR DESCENDING (LAD) ARTERY FOR CORONARY ARTERY DISEASE: Primary | ICD-10-CM

## 2017-12-13 DIAGNOSIS — I21.4 NSTEMI (NON-ST ELEVATED MYOCARDIAL INFARCTION) (HCC): ICD-10-CM

## 2017-12-13 PROCEDURE — 93798 PHYS/QHP OP CAR RHAB W/ECG: CPT

## 2017-12-13 NOTE — PROGRESS NOTES
Phase II patient, see  [place] documentation for exercise data documentation. . NAD note, skin w/p/d, denies CP, SOA, tolerated exercise well.

## 2018-02-01 ENCOUNTER — OFFICE VISIT (OUTPATIENT)
Dept: CARDIOLOGY | Facility: CLINIC | Age: 68
End: 2018-02-01

## 2018-02-01 VITALS
HEIGHT: 62 IN | SYSTOLIC BLOOD PRESSURE: 162 MMHG | BODY MASS INDEX: 24.44 KG/M2 | WEIGHT: 132.8 LBS | DIASTOLIC BLOOD PRESSURE: 80 MMHG | HEART RATE: 52 BPM

## 2018-02-01 DIAGNOSIS — E78.00 HYPERCHOLESTEROLEMIA: ICD-10-CM

## 2018-02-01 DIAGNOSIS — I25.10 CORONARY ARTERY DISEASE INVOLVING NATIVE CORONARY ARTERY OF NATIVE HEART WITHOUT ANGINA PECTORIS: Primary | ICD-10-CM

## 2018-02-01 DIAGNOSIS — I50.22 CHRONIC SYSTOLIC HEART FAILURE (HCC): ICD-10-CM

## 2018-02-01 DIAGNOSIS — I10 ESSENTIAL HYPERTENSION: ICD-10-CM

## 2018-02-01 PROCEDURE — 99214 OFFICE O/P EST MOD 30 MIN: CPT | Performed by: INTERNAL MEDICINE

## 2018-02-01 RX ORDER — LISINOPRIL 40 MG/1
40 TABLET ORAL DAILY
Qty: 30 TABLET | Refills: 11 | Status: SHIPPED | OUTPATIENT
Start: 2018-02-01 | End: 2018-02-01 | Stop reason: SDUPTHER

## 2018-02-01 RX ORDER — CARVEDILOL 12.5 MG/1
12.5 TABLET ORAL EVERY 12 HOURS SCHEDULED
Qty: 60 TABLET | Refills: 11 | Status: SHIPPED | OUTPATIENT
Start: 2018-02-01 | End: 2018-06-04 | Stop reason: SDUPTHER

## 2018-02-01 RX ORDER — AMLODIPINE BESYLATE 5 MG/1
5 TABLET ORAL DAILY
Qty: 30 TABLET | Refills: 11 | Status: SHIPPED | OUTPATIENT
Start: 2018-02-01 | End: 2018-06-04 | Stop reason: SDUPTHER

## 2018-02-01 RX ORDER — AMLODIPINE BESYLATE 5 MG/1
5 TABLET ORAL DAILY
Qty: 30 TABLET | Refills: 11 | Status: SHIPPED | OUTPATIENT
Start: 2018-02-01 | End: 2018-02-01 | Stop reason: SDUPTHER

## 2018-02-01 RX ORDER — CARVEDILOL 12.5 MG/1
12.5 TABLET ORAL EVERY 12 HOURS SCHEDULED
Qty: 60 TABLET | Refills: 11 | Status: SHIPPED | OUTPATIENT
Start: 2018-02-01 | End: 2018-02-01 | Stop reason: SDUPTHER

## 2018-02-01 RX ORDER — ESCITALOPRAM OXALATE 10 MG/1
10 TABLET ORAL DAILY
COMMUNITY

## 2018-02-01 RX ORDER — LISINOPRIL 40 MG/1
40 TABLET ORAL DAILY
Qty: 30 TABLET | Refills: 11 | Status: SHIPPED | OUTPATIENT
Start: 2018-02-01 | End: 2022-04-04 | Stop reason: SDUPTHER

## 2018-02-01 NOTE — PROGRESS NOTES
[facility] AT [address], [address]  [place], [place], [address] [phone]  [email or web address]           OUTPATIENT CLINIC FOLLOW UP NOTE    Patient Care Team:  Patient Care Team:  Steve Martinez MD as PCP - General (Family Medicine)    Subjective:   Reason for consultation:   Chief Complaint   Patient presents with   • Coronary Artery Disease     f/u       HPI:    Liliya Granado is a 67 y.o. female.  Coronary Artery Disease   Presents for follow-up visit.     The patient has a history of hypertension, hyperlipidemia with an intolerance to statins, who presented with chest pain and an NSTEMI in 8/2017, underwent left heart catheterization which revealed an 80% proximal LAD stenosis and tandem 50% distal LAD stenoses.  The lesions are now status post intervention.  Her ejection fraction was noted to be 45% on discharge with mild hypokinesis of the mid to distal anterior segments and apical segments.  The patient presents for follow-up.    The patient has been doing well.  She denies chest pain. The patient has had myalgias with study.  She has discontinued it and has not had recurrent symptoms.  She states that occasionally she feels short of breath after taking her Brilinta.  She completed cardiac rehabilitation without difficulty.    Blood pressure is been elevated at home.  She wakes up with headaches at times and takes an additional lisinopril 40 mg at that time.  Blood pressures typically are higher in the evenings.  Sometimes in the 150s to 160s.    PFSH:  Patient Active Problem List   Diagnosis   • Coronary artery disease involving native coronary artery of native heart without angina pectoris   • Essential hypertension   • Hypercholesterolemia         Current Outpatient Prescriptions:   •  aspirin 81 MG EC tablet, Take 81 mg by mouth Daily., Disp: , Rfl:   •  carvedilol (COREG) 6.25 MG tablet, Take 1 tablet by mouth Every 12 (Twelve) Hours. (Patient taking  "differently: Take 12.5 mg by mouth Every 12 (Twelve) Hours.), Disp: 60 tablet, Rfl: 11  •  escitalopram (LEXAPRO) 10 MG tablet, Take 10 mg by mouth Daily., Disp: , Rfl:   •  lisinopril (PRINIVIL,ZESTRIL) 10 MG tablet, Take 1 tablet by mouth Daily. (Patient taking differently: Take 30 mg by mouth Daily.), Disp: 30 tablet, Rfl: 11  •  nitroglycerin (NITROSTAT) 0.4 MG SL tablet, Place 1 tablet under the tongue Every 5 (Five) Minutes As Needed for Chest Pain. Take no more than 3 doses in 15 minutes., Disp: 25 tablet, Rfl: 11  •  Omega-3 Fatty Acids (FISH OIL) 1000 MG capsule capsule, Take 1,000 mg by mouth Daily With Breakfast., Disp: , Rfl:   •  pantoprazole (PROTONIX) 40 MG EC tablet, Take 40 mg by mouth Daily., Disp: , Rfl:   •  ticagrelor (BRILINTA) 90 MG tablet tablet, Take 1 tablet by mouth 2 (Two) Times a Day., Disp: 60 tablet, Rfl: 11  •  ezetimibe (ZETIA) 10 MG tablet, Take 1 tablet by mouth Daily., Disp: 30 tablet, Rfl: 11    Allergies   Allergen Reactions   • Codeine         reports that she has never smoked. She has never used smokeless tobacco.    Family History   Problem Relation Age of Onset   • No Known Problems Mother    • Cancer Father    • Heart failure Sister    • Heart disease Brother    • Heart disease Brother    • Heart disease Brother        Review of Systems:  Positive for dyspnea with exertion, headache, muscle aches  Negative for exertional chest pain, orthopnea, PND, lower extremity edema, palpitations, lightheadedness, syncope.   All other systems reviewed and are negative.    Objective:   Blood pressure 162/80, pulse 52, height 157.5 cm (62\"), weight 60.2 kg (132 lb 12.8 oz).  CONSTITUTIONAL: No acute distress, normal affect  RESPIRATORY: Normal effort. Clear to auscultation bilaterally without wheezing or rales  CARDIOVASCULAR: Right carotid bruit.  Regular rate and rhythm with normal S1 and S2. Without murmur, gallop or rub.  PERIPHERAL VASCULAR: Normal radial pulses bilaterally. There is " no lower extremity edema bilaterally.    Labs:  Lab Results   Component Value Date    ALT 25 08/13/2017    AST 23 08/13/2017     Lab Results   Component Value Date    CHOL 242 (H) 08/13/2017    TRIG 166 (H) 08/13/2017    HDL 42 (L) 08/13/2017    LDLCALC 167 (H) 08/13/2017    CREATININE 0.70 08/16/2017       Diagnostic Data:    Procedures  [facility] 8/15/17  · There was severe 1 vessel coronary artery disease involving the LAD.  There is mild coronary artery disease of the left main, RCA.  · There was an 80% discrete stenosis of the proximal LAD followed by 50% tandem stenoses in the mid segment.  The lesions are now status post tandem drug-eluting stents.  A 2.75 x 38 mm drug-eluting stent was placed proximally and a 2.5 x 8 mm drug-eluting stent was placed distally with a brief segment of overlap.  The stents were postdilated with a 2.75 mm noncompliant balloon.  · Mildly depressed left ventricular ejection fraction 45% with hypokinesis of the mid to distal anterior segments and apical segments  Carotid US 9/2017  1. No significant plaque. No occlusion.  2. No hemodynamically significant stenosis of either RIGHT or LEFT ICA.  3. Antegrade flow noted both vertebral arteries.      TTE 8/14/17  · The study is technically adequate for diagnosis.  · Left ventricular systolic function is low normal. Estimated EF = 45%.  · Left ventricular diastolic dysfunction (grade II) consistent with pseudonormalization.  · Mild aortic valve regurgitation is present.  · Mild mitral valve regurgitation is present  · There is no evidence of pericardial effusion.    Assessment and Plan:   Liliya was seen today for chest pain.    Diagnoses and all orders for this visit:    Coronary artery disease involving native coronary artery of native heart without angina pectoris  Hypercholesterolemia  -CCS class 0, continue aspirin, ticagrelor, carvedilol, omega-3 fatty acids  -The ticagrelor is expensive for her at around $43 per month, continue for  now, if she has ongoing difficulty with this at 1 year, will switch to Plavix  -Ongoing workup for a PCS K-9 inhibitor, paperwork submitted (?); prior intolerance to simvastatin, atorvastatin, rosuvastatin and developed an NSTEMI while off of statin therapy  -Now with myalgias with Zetia  -Cardiac rehabilitation Phase II completed    Chronic systolic heart failure  -Repeat limited echocardiogram for EF due to dyspnea    Essential hypertension  -Blood pressure has been elevated.  Continue carvedilol at 12.5 mg twice a day, lisinopril 40 mg daily.  The patient was instructed not to take lisinopril on an as-needed basis on top of her current dosing.  Add amlodipine 5 mg daily  -The patient will keep a blood pressure log for the next week.  We'll call her to discuss how it's been going and make additional adjustments    Carotid bruit, unspecified laterality  -No significant disease bilaterally on carotid ultrasound 9/2017      - Dietary and exercise counseling was provided during this visit  - Return in about 3 months (around 5/1/2018).    Emilio Sampson MD, MSc, [facility]  Interventional Cardiology  [facility] at [facility]

## 2018-02-12 ENCOUNTER — HOSPITAL ENCOUNTER (OUTPATIENT)
Dept: CARDIOLOGY | Facility: HOSPITAL | Age: 68
Discharge: HOME | End: 2018-02-12
Admitting: INTERNAL MEDICINE

## 2018-02-12 DIAGNOSIS — I50.22 CHRONIC SYSTOLIC HEART FAILURE (HCC): ICD-10-CM

## 2018-02-12 LAB
BH CV ECHO MEAS - % IVS THICK: -27.9 %
BH CV ECHO MEAS - % LVPW THICK: 15.8 %
BH CV ECHO MEAS - BSA(HAYCOCK): 1.6 M^2
BH CV ECHO MEAS - BSA: 1.6 M^2
BH CV ECHO MEAS - BZI_BMI: 24.1 KILOGRAMS/M^2
BH CV ECHO MEAS - BZI_METRIC_HEIGHT: 157.5 CM
BH CV ECHO MEAS - BZI_METRIC_WEIGHT: 59.9 KG
BH CV ECHO MEAS - CONTRAST EF 4CH: 60.4 ML/M^2
BH CV ECHO MEAS - EDV(CUBED): 104.4 ML
BH CV ECHO MEAS - EDV(MOD-SP4): 48 ML
BH CV ECHO MEAS - EDV(TEICH): 102.8 ML
BH CV ECHO MEAS - EF(CUBED): 61.1 %
BH CV ECHO MEAS - EF(MOD-SP4): 60.4 %
BH CV ECHO MEAS - EF(TEICH): 52.6 %
BH CV ECHO MEAS - ESV(CUBED): 40.6 ML
BH CV ECHO MEAS - ESV(MOD-SP4): 19 ML
BH CV ECHO MEAS - ESV(TEICH): 48.7 ML
BH CV ECHO MEAS - FS: 27 %
BH CV ECHO MEAS - IVS/LVPW: 1.4
BH CV ECHO MEAS - IVSD: 1.4 CM
BH CV ECHO MEAS - IVSS: 1 CM
BH CV ECHO MEAS - LV DIASTOLIC VOL/BSA (35-75): 30 ML/M^2
BH CV ECHO MEAS - LV MASS(C)D: 221.5 GRAMS
BH CV ECHO MEAS - LV MASS(C)DI: 138.3 GRAMS/M^2
BH CV ECHO MEAS - LV MASS(C)S: 118.8 GRAMS
BH CV ECHO MEAS - LV MASS(C)SI: 74.2 GRAMS/M^2
BH CV ECHO MEAS - LV SYSTOLIC VOL/BSA (12-30): 11.9 ML/M^2
BH CV ECHO MEAS - LVIDD: 4.7 CM
BH CV ECHO MEAS - LVIDS: 3.4 CM
BH CV ECHO MEAS - LVLD AP4: 6.3 CM
BH CV ECHO MEAS - LVLS AP4: 4.5 CM
BH CV ECHO MEAS - LVPWD: 1 CM
BH CV ECHO MEAS - LVPWS: 1.2 CM
BH CV ECHO MEAS - RVDD: 0.66 CM
BH CV ECHO MEAS - SI(CUBED): 39.8 ML/M^2
BH CV ECHO MEAS - SI(MOD-SP4): 18.1 ML/M^2
BH CV ECHO MEAS - SI(TEICH): 33.8 ML/M^2
BH CV ECHO MEAS - SV(CUBED): 63.8 ML
BH CV ECHO MEAS - SV(MOD-SP4): 29 ML
BH CV ECHO MEAS - SV(TEICH): 54.1 ML
LV EF 2D ECHO EST: 60 %

## 2018-02-12 PROCEDURE — 93308 TTE F-UP OR LMTD: CPT

## 2018-02-12 PROCEDURE — 93308 TTE F-UP OR LMTD: CPT | Performed by: INTERNAL MEDICINE

## 2018-02-13 ENCOUNTER — TELEPHONE (OUTPATIENT)
Dept: CARDIOLOGY | Facility: CLINIC | Age: 68
End: 2018-02-13

## 2018-02-13 NOTE — TELEPHONE ENCOUNTER
Results of echo reviewed with the patient.  Readings from BP log reviewed as well, average BP is 126/72.  I advised that she continue her current medications and should her SBP trend at or above 150 consistently to please call me for titration.  All questions and concerns addressed at this time.  Understanding verbalized.

## 2018-06-04 ENCOUNTER — OFFICE VISIT (OUTPATIENT)
Dept: CARDIOLOGY | Facility: CLINIC | Age: 68
End: 2018-06-04

## 2018-06-04 VITALS
HEART RATE: 45 BPM | WEIGHT: 130.2 LBS | BODY MASS INDEX: 23.96 KG/M2 | HEIGHT: 62 IN | DIASTOLIC BLOOD PRESSURE: 78 MMHG | SYSTOLIC BLOOD PRESSURE: 128 MMHG

## 2018-06-04 DIAGNOSIS — I25.10 CORONARY ARTERY DISEASE INVOLVING NATIVE CORONARY ARTERY OF NATIVE HEART WITHOUT ANGINA PECTORIS: Primary | ICD-10-CM

## 2018-06-04 DIAGNOSIS — E78.00 HYPERCHOLESTEROLEMIA: ICD-10-CM

## 2018-06-04 DIAGNOSIS — I50.22 CHRONIC SYSTOLIC HEART FAILURE (HCC): ICD-10-CM

## 2018-06-04 DIAGNOSIS — I10 ESSENTIAL HYPERTENSION: ICD-10-CM

## 2018-06-04 PROCEDURE — 99214 OFFICE O/P EST MOD 30 MIN: CPT | Performed by: INTERNAL MEDICINE

## 2018-06-04 RX ORDER — CLOPIDOGREL BISULFATE 75 MG/1
75 TABLET ORAL DAILY
Qty: 30 TABLET | Refills: 11 | Status: SHIPPED | OUTPATIENT
Start: 2018-06-04 | End: 2019-06-24 | Stop reason: SINTOL

## 2018-06-04 RX ORDER — CARVEDILOL 6.25 MG/1
6.25 TABLET ORAL EVERY 12 HOURS SCHEDULED
Qty: 60 TABLET | Refills: 11 | Status: SHIPPED | OUTPATIENT
Start: 2018-06-04 | End: 2019-06-24

## 2018-06-04 RX ORDER — AMLODIPINE BESYLATE 10 MG/1
10 TABLET ORAL DAILY
Qty: 30 TABLET | Refills: 11 | Status: SHIPPED | OUTPATIENT
Start: 2018-06-04 | End: 2019-09-03 | Stop reason: SDUPTHER

## 2018-06-04 NOTE — PROGRESS NOTES
[facility] AT [address], [address]  [place], [place], [address] [phone]  [facility]           OUTPATIENT CLINIC FOLLOW UP NOTE    Patient Care Team:  Patient Care Team:  Steve Martinez MD as PCP - General (Family Medicine)    Subjective:   Reason for consultation:   Chief Complaint   Patient presents with   • Coronary Artery Disease       HPI:    Liliya Granado is a 67 y.o. female.  Coronary Artery Disease   Presents for follow-up visit.     The patient has a history of hypertension, hyperlipidemia with an intolerance to statins, who presented with chest pain and an NSTEMI in 8/2017, underwent left heart catheterization which revealed an 80% proximal LAD stenosis and tandem 50% distal LAD stenoses.  The lesions are now status post intervention.  Her ejection fraction was noted to be 45% on discharge with mild hypokinesis of the mid to distal anterior segments and apical segments.  The patient presents for follow-up.    She continues to have mild dyspnea at times when taking ticagrelor, but this is unchanged. She reports that her blood pressure has been improved since starting amlodipine with an average being 120/80. She endorses moderately severe fatigue with exertional activities.  Improves with rest.  She denies any chest pain, dyspnea with exertion, lower extremity edema, or palpitations.    PFSH:  Patient Active Problem List   Diagnosis   • Coronary artery disease involving native coronary artery of native heart without angina pectoris   • Essential hypertension   • Hypercholesterolemia   • Chronic systolic heart failure         Current Outpatient Prescriptions:   •  amLODIPine (NORVASC) 5 MG tablet, Take 1 tablet by mouth Daily., Disp: 30 tablet, Rfl: 11  •  aspirin 81 MG EC tablet, Take 81 mg by mouth Daily., Disp: , Rfl:   •  carvedilol (COREG) 12.5 MG tablet, Take 1 tablet by mouth Every 12 (Twelve) Hours., Disp: 60 tablet, Rfl: 11  •   "escitalopram (LEXAPRO) 10 MG tablet, Take 10 mg by mouth Daily., Disp: , Rfl:   •  lisinopril (PRINIVIL,ZESTRIL) 40 MG tablet, Take 1 tablet by mouth Daily., Disp: 30 tablet, Rfl: 11  •  nitroglycerin (NITROSTAT) 0.4 MG SL tablet, Place 1 tablet under the tongue Every 5 (Five) Minutes As Needed for Chest Pain. Take no more than 3 doses in 15 minutes., Disp: 25 tablet, Rfl: 11  •  Omega-3 Fatty Acids (FISH OIL) 1000 MG capsule capsule, Take 1,000 mg by mouth Daily With Breakfast., Disp: , Rfl:   •  pantoprazole (PROTONIX) 40 MG EC tablet, Take 40 mg by mouth Daily., Disp: , Rfl:   •  ticagrelor (BRILINTA) 90 MG tablet tablet, Take 1 tablet by mouth 2 (Two) Times a Day., Disp: 60 tablet, Rfl: 11    Allergies   Allergen Reactions   • Codeine         reports that she has never smoked. She has never used smokeless tobacco.    Family History   Problem Relation Age of Onset   • No Known Problems Mother    • Cancer Father    • Heart failure Sister    • Heart disease Brother    • Heart disease Brother    • Heart disease Brother        Review of Systems:  Positive for dyspnea, Exertional fatigue  Negative for exertional chest pain, orthopnea, PND, lower extremity edema, palpitations, lightheadedness, syncope.   All other systems reviewed and are negative.    Objective:   Blood pressure 128/78, pulse (!) 45, height 157.5 cm (62\"), weight 59.1 kg (130 lb 3.2 oz).  CONSTITUTIONAL: No acute distress, normal affect  RESPIRATORY: Normal effort. Clear to auscultation bilaterally without wheezing or rales  CARDIOVASCULAR: Right carotid bruit.  Regular rate and rhythm with normal S1 and S2. Without murmur, gallop or rub.  PERIPHERAL VASCULAR: Normal radial pulses bilaterally. There is no lower extremity edema bilaterally.    Labs:  Lab Results   Component Value Date    ALT 25 08/13/2017    AST 23 08/13/2017     Lab Results   Component Value Date    CHOL 242 (H) 08/13/2017    TRIG 166 (H) 08/13/2017    HDL 42 (L) 08/13/2017    " CREATININE 0.70 08/16/2017       Diagnostic Data:    Procedures  [facility] 8/15/17  · There was severe 1 vessel coronary artery disease involving the LAD.  There is mild coronary artery disease of the left main, RCA.  · There was an 80% discrete stenosis of the proximal LAD followed by 50% tandem stenoses in the mid segment.  The lesions are now status post tandem drug-eluting stents.  A 2.75 x 38 mm drug-eluting stent was placed proximally and a 2.5 x 8 mm drug-eluting stent was placed distally with a brief segment of overlap.  The stents were postdilated with a 2.75 mm noncompliant balloon.  · Mildly depressed left ventricular ejection fraction 45% with hypokinesis of the mid to distal anterior segments and apical segments  Carotid US 9/2017  1. No significant plaque. No occlusion.  2. No hemodynamically significant stenosis of either RIGHT or LEFT ICA.  3. Antegrade flow noted both vertebral arteries.      TTE 2/2018  · The study is technically good for diagnosis. The study is limited for EF.  · Left ventricular systolic function is normal. Estimated EF = 60%.  · There is no evidence of pericardial effusion.     Compared to the study of 8/14/2017, the EF is now normal.    TTE 8/14/17  · The study is technically adequate for diagnosis.  · Left ventricular systolic function is low normal. Estimated EF = 45%.  · Left ventricular diastolic dysfunction (grade II) consistent with pseudonormalization.  · Mild aortic valve regurgitation is present.  · Mild mitral valve regurgitation is present  · There is no evidence of pericardial effusion.    Assessment and Plan:   Liliya was seen today for chest pain.    Diagnoses and all orders for this visit:    Coronary artery disease involving native coronary artery of native heart without angina pectoris  Hypercholesterolemia  -CCS class 0, continue aspirin, omega-3 fatty acids  -Switch ticagrelor to clopidogrel 75 mg PO daily.   -Ongoing workup for a PCS K-9 inhibitor, Patient signed  paperwork today.  Prior intolerance to simvastatin, atorvastatin, rosuvastatin, Zetia and developed an NSTEMI while off of statin therapy  - Dietary and exercise counseling was provided during this visit    Chronic systolic heart failure  -Most recent EF 60%    Bradycardia  -Having exertional fatigue.  -Decreasing carvedilol to 6.25 mg twice a day, increase amlodipine to 10 mg daily  -If symptoms do not improve, would place a 48 hour Holter    Essential hypertension  -Blood pressure has been stable since starting amlodipine.   -Making the above changes to carvedilol amlodipine due to relative bradycardia    Carotid bruit, unspecified laterality  -No significant disease bilaterally on carotid ultrasound 9/2017    - Return in about 6 months (around 12/4/2018).    TERENCE Carnes obtained past medical, family history, social history, review of systems and functioned as a scribe for the remainder of the dictation for Dr. Sampson.    I, Emilio Sampson MD, personally performed the services as scribed by the above named individual. I have made any necessary edits and it is both accurate and complete.     Emilio Sampson MD, MSc, [facility]  Interventional Cardiology  [facility] at [facility]

## 2018-06-26 ENCOUNTER — TELEPHONE (OUTPATIENT)
Dept: CARDIOLOGY | Facility: CLINIC | Age: 68
End: 2018-06-26

## 2018-06-26 NOTE — TELEPHONE ENCOUNTER
LVM requesting return call so that I may advise that she have an updated lipid panel drawn to submit for Repatha approval.  Will await return call.

## 2018-07-12 ENCOUNTER — LAB (OUTPATIENT)
Dept: LAB | Facility: HOSPITAL | Age: 68
End: 2018-07-12

## 2018-07-12 DIAGNOSIS — E78.00 HYPERCHOLESTEROLEMIA: ICD-10-CM

## 2018-07-12 DIAGNOSIS — E78.00 HYPERCHOLESTEROLEMIA: Primary | ICD-10-CM

## 2018-07-12 LAB
CHOLEST SERPL-MCNC: 243 MG/DL (ref 0–200)
HDLC SERPL-MCNC: 46 MG/DL (ref 60–100)
LDLC SERPL CALC-MCNC: 170 MG/DL (ref 0–100)
LDLC/HDLC SERPL: 3.69 {RATIO}
TRIGL SERPL-MCNC: 137 MG/DL (ref 0–150)
VLDLC SERPL-MCNC: 27.4 MG/DL

## 2018-07-12 PROCEDURE — 36415 COLL VENOUS BLD VENIPUNCTURE: CPT

## 2018-07-12 PROCEDURE — 80061 LIPID PANEL: CPT

## 2018-07-17 ENCOUNTER — TELEPHONE (OUTPATIENT)
Dept: CARDIOLOGY | Facility: CLINIC | Age: 68
End: 2018-07-17

## 2018-09-28 ENCOUNTER — TELEPHONE (OUTPATIENT)
Dept: CARDIOLOGY | Facility: CLINIC | Age: 68
End: 2018-09-28

## 2018-09-28 DIAGNOSIS — E78.2 MIXED HYPERLIPIDEMIA: Primary | ICD-10-CM

## 2018-09-28 NOTE — TELEPHONE ENCOUNTER
LVM requesting return call to ask if the patient can have new lipid panel drawn for Repatha re-authorization.  Will await return call.

## 2018-09-28 NOTE — TELEPHONE ENCOUNTER
I spoke with the patient and let her know that we need a lipid panel.  She is amenable to this.  She states she will go to [facility] next week and have this drawn.  Orders placed.

## 2018-10-09 ENCOUNTER — LAB (OUTPATIENT)
Dept: LAB | Facility: HOSPITAL | Age: 68
End: 2018-10-09

## 2018-10-09 DIAGNOSIS — E78.2 MIXED HYPERLIPIDEMIA: ICD-10-CM

## 2018-10-09 LAB
CHOLEST SERPL-MCNC: 107 MG/DL (ref 0–200)
HDLC SERPL-MCNC: 40 MG/DL (ref 60–100)
LDLC SERPL CALC-MCNC: 50 MG/DL (ref 0–100)
LDLC/HDLC SERPL: 1.24 {RATIO}
TRIGL SERPL-MCNC: 87 MG/DL (ref 0–150)
VLDLC SERPL-MCNC: 17.4 MG/DL

## 2018-10-09 PROCEDURE — 80061 LIPID PANEL: CPT

## 2018-10-09 PROCEDURE — 36415 COLL VENOUS BLD VENIPUNCTURE: CPT

## 2018-10-15 ENCOUNTER — TELEPHONE (OUTPATIENT)
Dept: CARDIOLOGY | Facility: CLINIC | Age: 68
End: 2018-10-15

## 2018-10-15 NOTE — TELEPHONE ENCOUNTER
Lab results reviewed with the patient and faxed to [facility] for re-prior authorization.  All questions and concerns addressed at this time.  Understanding verbalized.

## 2018-12-06 NOTE — PROGRESS NOTES
[facility] AT [address], [address]  [place], [place], [address] [phone]  [facility]           OUTPATIENT CLINIC FOLLOW UP NOTE    Patient Care Team:  Patient Care Team:  Steve Martinez MD as PCP - General (Family Medicine)    Subjective:   Reason for consultation:   Chief Complaint   Patient presents with   • Coronary Artery Disease       HPI:    Liliya Granado is a 68 y.o. female.  Coronary Artery Disease   Presents for follow-up visit.     The patient has a history of hypertension, hyperlipidemia with an intolerance to statins, who presented with chest pain and an NSTEMI in 8/2017, underwent left heart catheterization which revealed an 80% proximal LAD stenosis and tandem 50% distal LAD stenoses.  The lesions are now status post intervention.  Her ejection fraction was noted to be 45% on discharge with mild hypokinesis of the mid to distal anterior segments and apical segments.  The patient presents for follow-up.    Since last being seen the patient reports that she has been doing well from a cardiac standpoint. She does continue to have exertional fatigue, but this is unchanged. She denies any chest pain, shortness of breath, lower extremity edema, or palpitations. Her blood pressure is typically in the 120s systolic at home.  She reports taking Repatha.     PFSH:  Patient Active Problem List   Diagnosis   • Coronary artery disease involving native coronary artery of native heart without angina pectoris   • Essential hypertension   • Hypercholesterolemia   • Chronic systolic heart failure (CMS/HCC)         Current Outpatient Medications:   •  amLODIPine (NORVASC) 10 MG tablet, Take 1 tablet by mouth Daily., Disp: 30 tablet, Rfl: 11  •  aspirin 81 MG EC tablet, Take 81 mg by mouth Daily., Disp: , Rfl:   •  carvedilol (COREG) 6.25 MG tablet, Take 1 tablet by mouth Every 12 (Twelve) Hours., Disp: 60 tablet, Rfl: 11  •  clopidogrel (PLAVIX) 75 MG tablet,  "Take 1 tablet by mouth Daily., Disp: 30 tablet, Rfl: 11  •  escitalopram (LEXAPRO) 10 MG tablet, Take 10 mg by mouth Daily., Disp: , Rfl:   •  lisinopril (PRINIVIL,ZESTRIL) 40 MG tablet, Take 1 tablet by mouth Daily., Disp: 30 tablet, Rfl: 11  •  nitroglycerin (NITROSTAT) 0.4 MG SL tablet, Place 1 tablet under the tongue Every 5 (Five) Minutes As Needed for Chest Pain. Take no more than 3 doses in 15 minutes., Disp: 25 tablet, Rfl: 11  •  Omega-3 Fatty Acids (FISH OIL) 1000 MG capsule capsule, Take 1,000 mg by mouth Daily With Breakfast., Disp: , Rfl:   •  pantoprazole (PROTONIX) 40 MG EC tablet, Take 40 mg by mouth Daily., Disp: , Rfl:     Allergies   Allergen Reactions   • Codeine         reports that  has never smoked. she has never used smokeless tobacco.    Family History   Problem Relation Age of Onset   • No Known Problems Mother    • Cancer Father    • Heart failure Sister    • Heart disease Brother    • Heart disease Brother    • Heart disease Brother        Review of Systems:  Positive for exertional fatigue  Negative for exertional chest pain, dyspnea, orthopnea, PND, lower extremity edema, palpitations, lightheadedness, syncope.   All other systems reviewed and are negative.    Objective:   Blood pressure 140/68, pulse 56, height 157.5 cm (62\"), weight 58.4 kg (128 lb 12.8 oz).  CONSTITUTIONAL: No acute distress, normal affect  RESPIRATORY: Normal effort. Clear to auscultation bilaterally without wheezing or rales  CARDIOVASCULAR: Right carotid bruit.  Regular rate and rhythm with normal S1 and S2. Without murmur, gallop or rub.  PERIPHERAL VASCULAR: Normal radial pulses bilaterally. There is no lower extremity edema bilaterally.    Labs:  Lab Results   Component Value Date    ALT 25 08/13/2017    AST 23 08/13/2017     Lab Results   Component Value Date    CHOL 107 10/09/2018    TRIG 87 10/09/2018    HDL 40 (L) 10/09/2018    CREATININE 0.70 08/16/2017       Diagnostic Data:    Procedures    [facility] " 8/15/17  · There was severe 1 vessel coronary artery disease involving the LAD.  There is mild coronary artery disease of the left main, RCA.  · There was an 80% discrete stenosis of the proximal LAD followed by 50% tandem stenoses in the mid segment.  The lesions are now status post tandem drug-eluting stents.  A 2.75 x 38 mm drug-eluting stent was placed proximally and a 2.5 x 8 mm drug-eluting stent was placed distally with a brief segment of overlap.  The stents were postdilated with a 2.75 mm noncompliant balloon.  · Mildly depressed left ventricular ejection fraction 45% with hypokinesis of the mid to distal anterior segments and apical segments  Carotid US 9/2017  1. No significant plaque. No occlusion.  2. No hemodynamically significant stenosis of either RIGHT or LEFT ICA.  3. Antegrade flow noted both vertebral arteries.      TTE 2/2018  · The study is technically good for diagnosis. The study is limited for EF.  · Left ventricular systolic function is normal. Estimated EF = 60%.  · There is no evidence of pericardial effusion.     Compared to the study of 8/14/2017, the EF is now normal.    TTE 8/14/17  · The study is technically adequate for diagnosis.  · Left ventricular systolic function is low normal. Estimated EF = 45%.  · Left ventricular diastolic dysfunction (grade II) consistent with pseudonormalization.  · Mild aortic valve regurgitation is present.  · Mild mitral valve regurgitation is present  · There is no evidence of pericardial effusion.    Assessment and Plan:   Liliya was seen today for chest pain.    Diagnoses and all orders for this visit:    Coronary artery disease involving native coronary artery of native heart without angina pectoris  Hypercholesterolemia  -CCS class 0, continue DAPT, omega-3 fatty acids  -Will discontinue Plavix after next visit if stable  -Continue Repatha. Adequate response to injections. Prior intolerance to simvastatin, atorvastatin, rosuvastatin, Zetia   -  Dietary and exercise counseling was provided during this visit    Chronic systolic heart failure  -NYHA class I-II  -Most recent EF 60%    Bradycardia  -Ongoing, unchanged exertional fatigue. WiIl continue to monitor.     Essential hypertension  -Stable. Continue amlodipine, Coreg and lisinopril.    Carotid bruit, unspecified laterality  -No significant disease bilaterally on carotid ultrasound 9/2017    - Return in about 6 months (around 6/10/2019).      TERENCE Carnes obtained past medical, family history, social history, review of systems and functioned as a scribe for the remainder of the dictation for Dr. Sampson.     I, Emilio Sampson MD, personally performed the services as scribed by the above named individual. I have made any necessary edits and it is both accurate and complete.     Emilio Sampson MD, MSc, [facility]  Interventional Cardiology  [facility] at [facility]

## 2018-12-10 ENCOUNTER — OFFICE VISIT (OUTPATIENT)
Dept: CARDIOLOGY | Facility: CLINIC | Age: 68
End: 2018-12-10

## 2018-12-10 VITALS
HEIGHT: 62 IN | HEART RATE: 56 BPM | BODY MASS INDEX: 23.7 KG/M2 | SYSTOLIC BLOOD PRESSURE: 140 MMHG | WEIGHT: 128.8 LBS | DIASTOLIC BLOOD PRESSURE: 68 MMHG

## 2018-12-10 DIAGNOSIS — R09.89 CAROTID BRUIT, UNSPECIFIED LATERALITY: ICD-10-CM

## 2018-12-10 DIAGNOSIS — I50.22 CHRONIC SYSTOLIC HEART FAILURE (HCC): ICD-10-CM

## 2018-12-10 DIAGNOSIS — I25.10 CORONARY ARTERY DISEASE INVOLVING NATIVE CORONARY ARTERY OF NATIVE HEART WITHOUT ANGINA PECTORIS: Primary | ICD-10-CM

## 2018-12-10 DIAGNOSIS — I10 ESSENTIAL HYPERTENSION: ICD-10-CM

## 2018-12-10 DIAGNOSIS — E78.2 MIXED HYPERLIPIDEMIA: ICD-10-CM

## 2018-12-10 PROCEDURE — 99214 OFFICE O/P EST MOD 30 MIN: CPT | Performed by: INTERNAL MEDICINE

## 2018-12-17 ENCOUNTER — TELEPHONE (OUTPATIENT)
Dept: CARDIOLOGY | Facility: CLINIC | Age: 68
End: 2018-12-17

## 2018-12-17 NOTE — TELEPHONE ENCOUNTER
Pt called asking if MRI was okay with cardiac stents. LVM for patient to return call with any questions. Will advise pt that proceeding with MRI is safe with stents.

## 2019-01-07 ENCOUNTER — HOSPITAL ENCOUNTER (OUTPATIENT)
Dept: MAMMOGRAPHY | Facility: HOSPITAL | Age: 69
Discharge: HOME | End: 2019-01-07
Admitting: FAMILY MEDICINE

## 2019-01-07 DIAGNOSIS — Z12.39 SCREENING BREAST EXAMINATION: ICD-10-CM

## 2019-01-07 PROCEDURE — 77067 SCR MAMMO BI INCL CAD: CPT | Performed by: RADIOLOGY

## 2019-01-07 PROCEDURE — 77067 SCR MAMMO BI INCL CAD: CPT

## 2019-01-07 PROCEDURE — 77063 BREAST TOMOSYNTHESIS BI: CPT | Performed by: RADIOLOGY

## 2019-01-07 PROCEDURE — 77063 BREAST TOMOSYNTHESIS BI: CPT

## 2019-06-24 ENCOUNTER — LAB (OUTPATIENT)
Dept: LAB | Facility: HOSPITAL | Age: 69
End: 2019-06-24

## 2019-06-24 ENCOUNTER — OFFICE VISIT (OUTPATIENT)
Dept: CARDIOLOGY | Facility: CLINIC | Age: 69
End: 2019-06-24

## 2019-06-24 VITALS
SYSTOLIC BLOOD PRESSURE: 158 MMHG | WEIGHT: 127 LBS | BODY MASS INDEX: 23.37 KG/M2 | HEART RATE: 51 BPM | HEIGHT: 62 IN | DIASTOLIC BLOOD PRESSURE: 64 MMHG

## 2019-06-24 DIAGNOSIS — E78.00 HYPERCHOLESTEROLEMIA: ICD-10-CM

## 2019-06-24 DIAGNOSIS — I25.10 CORONARY ARTERY DISEASE INVOLVING NATIVE CORONARY ARTERY OF NATIVE HEART WITHOUT ANGINA PECTORIS: Primary | ICD-10-CM

## 2019-06-24 DIAGNOSIS — I10 ESSENTIAL HYPERTENSION: ICD-10-CM

## 2019-06-24 DIAGNOSIS — I50.22 CHRONIC SYSTOLIC HEART FAILURE (HCC): ICD-10-CM

## 2019-06-24 LAB — ARTICHOKE IGE QN: 75 MG/DL (ref 0–100)

## 2019-06-24 PROCEDURE — 36415 COLL VENOUS BLD VENIPUNCTURE: CPT

## 2019-06-24 PROCEDURE — 83721 ASSAY OF BLOOD LIPOPROTEIN: CPT

## 2019-06-24 PROCEDURE — 99214 OFFICE O/P EST MOD 30 MIN: CPT | Performed by: INTERNAL MEDICINE

## 2019-06-24 RX ORDER — CARVEDILOL 3.12 MG/1
3.12 TABLET ORAL 2 TIMES DAILY WITH MEALS
Qty: 180 TABLET | Refills: 3 | Status: SHIPPED | OUTPATIENT
Start: 2019-06-24 | End: 2020-12-04 | Stop reason: SDUPTHER

## 2019-06-24 NOTE — PROGRESS NOTES
[facility] AT [address], [address]  [place], [place], [address] [phone]  [facility]           OUTPATIENT CLINIC FOLLOW UP NOTE    Patient Care Team:  Patient Care Team:  Steve Martinez MD as PCP - General (Family Medicine)    Subjective:   Reason for consultation:   Chief Complaint   Patient presents with   • Coronary Artery Disease       HPI:    Liliya Granado is a 68 y.o. female.  The patient has a history of hypertension, hyperlipidemia with an intolerance to statins, who presented with chest pain and an NSTEMI in 8/2017, underwent left heart catheterization which revealed an 80% proximal LAD stenosis and tandem 50% distal LAD stenoses.  The lesions are now status post intervention.  Her ejection fraction was noted to be 45% on discharge with mild hypokinesis of the mid to distal anterior segments and apical segments.  The patient presents for follow-up.    Since last being seen the patient reports that she has been doing well from a cardiac standpoint. She does continue to have exertional fatigue, but this is unchanged. She denies any chest pain, shortness of breath, lower extremity edema, or palpitations. Her blood pressure is typically in the 120s systolic at home.  She reports taking Repatha.     PFSH:  Patient Active Problem List   Diagnosis   • Coronary artery disease involving native coronary artery of native heart without angina pectoris   • Essential hypertension   • Hypercholesterolemia   • Chronic systolic heart failure (CMS/HCC)         Current Outpatient Medications:   •  amLODIPine (NORVASC) 10 MG tablet, Take 1 tablet by mouth Daily., Disp: 30 tablet, Rfl: 11  •  aspirin 81 MG EC tablet, Take 81 mg by mouth Daily., Disp: , Rfl:   •  carvedilol (COREG) 3.125 MG tablet, Take 1 tablet by mouth 2 (Two) Times a Day With Meals., Disp: 180 tablet, Rfl: 3  •  escitalopram (LEXAPRO) 10 MG tablet, Take 10 mg by mouth Daily., Disp: , Rfl:   •   "lisinopril (PRINIVIL,ZESTRIL) 40 MG tablet, Take 1 tablet by mouth Daily., Disp: 30 tablet, Rfl: 11  •  nitroglycerin (NITROSTAT) 0.4 MG SL tablet, Place 1 tablet under the tongue Every 5 (Five) Minutes As Needed for Chest Pain. Take no more than 3 doses in 15 minutes., Disp: 25 tablet, Rfl: 11  •  Omega-3 Fatty Acids (FISH OIL) 1000 MG capsule capsule, Take 1,000 mg by mouth Daily With Breakfast., Disp: , Rfl:   •  pantoprazole (PROTONIX) 40 MG EC tablet, Take 40 mg by mouth Daily., Disp: , Rfl:     Allergies   Allergen Reactions   • Codeine         reports that she has never smoked. She has never used smokeless tobacco.    Family History   Problem Relation Age of Onset   • No Known Problems Mother    • Cancer Father    • Heart failure Sister    • Heart disease Brother    • Heart disease Brother    • Heart disease Brother    • Breast cancer Neg Hx        Review of Systems:  Positive for exertional fatigue  Negative for exertional chest pain, dyspnea, orthopnea, PND, lower extremity edema, palpitations, lightheadedness, syncope.     Objective:   Blood pressure 158/64, pulse 51, height 157.5 cm (62\"), weight 57.6 kg (127 lb).  CONSTITUTIONAL: No acute distress, normal affect  RESPIRATORY: Normal effort. Clear to auscultation bilaterally without wheezing or rales  CARDIOVASCULAR: Right carotid bruit.  Regular rate and rhythm with normal S1 and S2. Without murmur, gallop or rub.  PERIPHERAL VASCULAR: Normal radial pulses bilaterally. There is no lower extremity edema bilaterally.    Labs:  Lab Results   Component Value Date    ALT 25 08/13/2017    AST 23 08/13/2017     Lab Results   Component Value Date    CHOL 107 10/09/2018    TRIG 87 10/09/2018    HDL 40 (L) 10/09/2018    CREATININE 0.70 08/16/2017       Diagnostic Data:    Procedures    [facility] 8/15/17  · There was severe 1 vessel coronary artery disease involving the LAD.  There is mild coronary artery disease of the left main, RCA.  · There was an 80% discrete " stenosis of the proximal LAD followed by 50% tandem stenoses in the mid segment.  The lesions are now status post tandem drug-eluting stents.  A 2.75 x 38 mm drug-eluting stent was placed proximally and a 2.5 x 8 mm drug-eluting stent was placed distally with a brief segment of overlap.  The stents were postdilated with a 2.75 mm noncompliant balloon.  · Mildly depressed left ventricular ejection fraction 45% with hypokinesis of the mid to distal anterior segments and apical segments  Carotid US 9/2017  1. No significant plaque. No occlusion.  2. No hemodynamically significant stenosis of either RIGHT or LEFT ICA.  3. Antegrade flow noted both vertebral arteries.      TTE 2/2018  · The study is technically good for diagnosis. The study is limited for EF.  · Left ventricular systolic function is normal. Estimated EF = 60%.  · There is no evidence of pericardial effusion.     Compared to the study of 8/14/2017, the EF is now normal.    TTE 8/14/17  · The study is technically adequate for diagnosis.  · Left ventricular systolic function is low normal. Estimated EF = 45%.  · Left ventricular diastolic dysfunction (grade II) consistent with pseudonormalization.  · Mild aortic valve regurgitation is present.  · Mild mitral valve regurgitation is present  · There is no evidence of pericardial effusion.    Assessment and Plan:   Liliya was seen today for chest pain.    Diagnoses and all orders for this visit:    Coronary artery disease involving native coronary artery of native heart without angina pectoris  Hypercholesterolemia  -CCS class 0, continue ASA, beta-blocker, Repatha, omega-3 fatty acids  -Discontinue clopidogrel  -Prior intolerance to simvastatin, atorvastatin, rosuvastatin, Zetia   -Repeat direct LDL today    Chronic systolic heart failure  -NYHA class I-II  -Most recent EF 60%  -Continue beta-blocker and lisinopril    Bradycardia  -Ongoing, unchanged exertional fatigue.  Decrease carvedilol to 3.125 mg twice  daily  -The patient was advised to call us after couple weeks.  If she still having the same exertional fatigue, will discontinue carvedilol and continue to monitor.    Essential hypertension  -Stable. Continue amlodipine, Coreg and lisinopril.    Carotid bruit, unspecified laterality  -No significant disease bilaterally on carotid ultrasound 9/2017    - Return in about 6 months (around 12/24/2019).      Emilio Sampson MD, MSc, [facility]  Interventional Cardiology  [facility]

## 2019-06-25 ENCOUNTER — TELEPHONE (OUTPATIENT)
Dept: CARDIOLOGY | Facility: CLINIC | Age: 69
End: 2019-06-25

## 2019-06-25 NOTE — TELEPHONE ENCOUNTER
----- Message from Emilio Sampson MD sent at 6/25/2019  3:43 PM EDT -----  Please let the patient know her cholesterol level is still overall okay but is higher than last time.  These to continue eating a heart healthy diet and exercising as tolerated

## 2019-06-25 NOTE — TELEPHONE ENCOUNTER
Pt contacted with lab results and recommendations per [facility]. LVM requesting return call with any questions.

## 2019-07-12 RX ORDER — EVOLOCUMAB 420 MG/3.5
KIT SUBCUTANEOUS
Qty: 1 CARTRIDGE | Refills: 11 | Status: SHIPPED | OUTPATIENT
Start: 2019-07-12 | End: 2020-07-21 | Stop reason: SDUPTHER

## 2019-09-06 RX ORDER — AMLODIPINE BESYLATE 10 MG/1
TABLET ORAL
Qty: 120 TABLET | Refills: 0 | Status: SHIPPED | OUTPATIENT
Start: 2019-09-06 | End: 2022-04-04 | Stop reason: SDUPTHER

## 2019-10-10 ENCOUNTER — TELEPHONE (OUTPATIENT)
Dept: CARDIOLOGY | Facility: CLINIC | Age: 69
End: 2019-10-10

## 2020-01-13 ENCOUNTER — OFFICE VISIT (OUTPATIENT)
Dept: CARDIOLOGY | Facility: CLINIC | Age: 70
End: 2020-01-13

## 2020-01-13 VITALS
HEIGHT: 62 IN | DIASTOLIC BLOOD PRESSURE: 64 MMHG | WEIGHT: 127 LBS | SYSTOLIC BLOOD PRESSURE: 120 MMHG | OXYGEN SATURATION: 96 % | BODY MASS INDEX: 23.37 KG/M2 | HEART RATE: 54 BPM

## 2020-01-13 DIAGNOSIS — I50.22 CHRONIC SYSTOLIC HEART FAILURE (HCC): ICD-10-CM

## 2020-01-13 DIAGNOSIS — I25.10 CORONARY ARTERY DISEASE INVOLVING NATIVE CORONARY ARTERY OF NATIVE HEART WITHOUT ANGINA PECTORIS: Primary | ICD-10-CM

## 2020-01-13 DIAGNOSIS — E78.00 HYPERCHOLESTEROLEMIA: ICD-10-CM

## 2020-01-13 DIAGNOSIS — I10 ESSENTIAL HYPERTENSION: ICD-10-CM

## 2020-01-13 PROCEDURE — 99213 OFFICE O/P EST LOW 20 MIN: CPT | Performed by: INTERNAL MEDICINE

## 2020-01-13 NOTE — PROGRESS NOTES
[facility] AT [address], [address]  [place], [place], [address] [phone]  [facility]           OUTPATIENT CLINIC FOLLOW UP NOTE    Patient Care Team:  Patient Care Team:  Steve Martinez MD as PCP - General (Family Medicine)  Dubin, Ronald S, MD as PCP - Claims Attributed    Subjective:   Reason for consultation:   Chief Complaint   Patient presents with   • Coronary artery disease involving native coronary artery of        HPI:    Liliya Granado is a 69 y.o. female.  The patient has a history of hypertension, hyperlipidemia with an intolerance to statins, who presented with chest pain and an NSTEMI in 8/2017, underwent left heart catheterization which revealed an 80% proximal LAD stenosis and tandem 50% distal LAD stenoses.  The lesions are now status post intervention.  Her ejection fraction was noted to be 45% on discharge with mild hypokinesis of the mid to distal anterior segments and apical segments.      The patient presents for follow-up.    Since last being seen the patient reports that she has been doing well from a cardiac standpoint. She denies chest pain, shortness of breath, lower extremity edema, or palpitations.     She reports taking Repatha.    Has some residual left wrist pain after a traumatic fracture     Review of Systems:  Negative for exertional chest pain, dyspnea, orthopnea, PND, lower extremity edema, palpitations, lightheadedness, syncope.     PFSH:  Patient Active Problem List   Diagnosis   • Coronary artery disease involving native coronary artery of native heart without angina pectoris   • Essential hypertension   • Hypercholesterolemia   • Chronic systolic heart failure (CMS/HCC)         Current Outpatient Medications:   •  amLODIPine (NORVASC) 10 MG tablet, take 1 tablet by mouth once daily, Disp: 120 tablet, Rfl: 0  •  aspirin 81 MG EC tablet, Take 81 mg by mouth Daily., Disp: , Rfl:   •  carvedilol (COREG) 3.125 MG  "tablet, Take 1 tablet by mouth 2 (Two) Times a Day With Meals., Disp: 180 tablet, Rfl: 3  •  escitalopram (LEXAPRO) 10 MG tablet, Take 10 mg by mouth Daily., Disp: , Rfl:   •  lisinopril (PRINIVIL,ZESTRIL) 40 MG tablet, Take 1 tablet by mouth Daily., Disp: 30 tablet, Rfl: 11  •  nitroglycerin (NITROSTAT) 0.4 MG SL tablet, Place 1 tablet under the tongue Every 5 (Five) Minutes As Needed for Chest Pain. Take no more than 3 doses in 15 minutes., Disp: 25 tablet, Rfl: 11  •  Omega-3 Fatty Acids (FISH OIL) 1000 MG capsule capsule, Take 1,000 mg by mouth Daily With Breakfast., Disp: , Rfl:   •  pantoprazole (PROTONIX) 40 MG EC tablet, Take 40 mg by mouth 2 (Two) Times a Day As Needed., Disp: , Rfl:   •  REPATHA PUSHTRONEX SYSTEM 420 MG/3.5ML solution cartridge, ADMINISTER 420MG(3.5ML) UNDER THE SKIN USING A PUSHTROREX SYSTEM ONCE MONTHLY, Disp: 1 cartridge., Rfl: 11    Allergies   Allergen Reactions   • Codeine Rash        reports that she has never smoked. She has never used smokeless tobacco.    Family History   Problem Relation Age of Onset   • No Known Problems Mother    • Cancer Father    • Heart failure Sister    • Heart disease Brother    • Heart disease Brother    • Heart disease Brother    • Breast cancer Neg Hx            Objective:   Blood pressure 120/64, pulse 54, height 157.5 cm (62\"), weight 57.6 kg (127 lb), SpO2 96 %.  CONSTITUTIONAL: No acute distress, normal affect  RESPIRATORY: Normal effort. Clear to auscultation bilaterally without wheezing or rales  CARDIOVASCULAR: Soft right carotid bruit.  Regular rate and rhythm with normal S1 and S2. Without murmur, gallop or rub.  PERIPHERAL VASCULAR: Normal radial pulses bilaterally. There is no lower extremity edema bilaterally.    Labs:  Lab Results   Component Value Date    ALT 25 08/13/2017    AST 23 08/13/2017     Lab Results   Component Value Date    CHOL 107 10/09/2018    TRIG 87 10/09/2018    HDL 40 (L) 10/09/2018    CREATININE 0.70 08/16/2017 "       Diagnostic Data:    Procedures    [facility] 8/15/17  · There was severe 1 vessel coronary artery disease involving the LAD.  There is mild coronary artery disease of the left main, RCA.  · There was an 80% discrete stenosis of the proximal LAD followed by 50% tandem stenoses in the mid segment.  The lesions are now status post tandem drug-eluting stents.  A 2.75 x 38 mm drug-eluting stent was placed proximally and a 2.5 x 8 mm drug-eluting stent was placed distally with a brief segment of overlap.  The stents were postdilated with a 2.75 mm noncompliant balloon.  · Mildly depressed left ventricular ejection fraction 45% with hypokinesis of the mid to distal anterior segments and apical segments  Carotid US 9/2017  1. No significant plaque. No occlusion.  2. No hemodynamically significant stenosis of either RIGHT or LEFT ICA.  3. Antegrade flow noted both vertebral arteries.      TTE 2/2018  · The study is technically good for diagnosis. The study is limited for EF.  · Left ventricular systolic function is normal. Estimated EF = 60%.  · There is no evidence of pericardial effusion.     Compared to the study of 8/14/2017, the EF is now normal.    TTE 8/14/17  · The study is technically adequate for diagnosis.  · Left ventricular systolic function is low normal. Estimated EF = 45%.  · Left ventricular diastolic dysfunction (grade II) consistent with pseudonormalization.  · Mild aortic valve regurgitation is present.  · Mild mitral valve regurgitation is present  · There is no evidence of pericardial effusion.    Assessment and Plan:   Liliya was seen today for chest pain.    Diagnoses and all orders for this visit:    Coronary artery disease involving native coronary artery of native heart without angina pectoris  Hypercholesterolemia  -CCS class 0, continue ASA, beta-blocker, Repatha  -Prior intolerance to simvastatin, atorvastatin, rosuvastatin, Zetia   -Repeat lipid panel with PCP if she has her blood drawn before  next appointment.  If not, will do it done    Chronic systolic heart failure  -NYHA class I-II  -Most recent EF 60%  -Continue beta-blocker and lisinopril    Bradycardia  -Stable on low-dose carvedilol.    Essential hypertension  -Stable. Continue amlodipine, Coreg and lisinopril.    Carotid bruit, unspecified laterality  -No significant disease bilaterally on carotid ultrasound 9/2017  -Bruits still very soft, will consider repeat carotid study at next exam    - Return in about 6 months (around 7/13/2020).      Emilio Sampson MD, MSc, [facility]  Interventional Cardiology  [facility] at [facility]

## 2020-07-23 ENCOUNTER — OFFICE VISIT (OUTPATIENT)
Dept: CARDIOLOGY | Facility: CLINIC | Age: 70
End: 2020-07-23

## 2020-07-23 VITALS
WEIGHT: 122 LBS | SYSTOLIC BLOOD PRESSURE: 138 MMHG | HEART RATE: 56 BPM | BODY MASS INDEX: 22.45 KG/M2 | HEIGHT: 62 IN | DIASTOLIC BLOOD PRESSURE: 62 MMHG

## 2020-07-23 DIAGNOSIS — I25.10 CORONARY ARTERY DISEASE INVOLVING NATIVE CORONARY ARTERY OF NATIVE HEART WITHOUT ANGINA PECTORIS: Primary | ICD-10-CM

## 2020-07-23 DIAGNOSIS — I50.22 CHRONIC SYSTOLIC HEART FAILURE (HCC): ICD-10-CM

## 2020-07-23 DIAGNOSIS — I10 ESSENTIAL HYPERTENSION: ICD-10-CM

## 2020-07-23 DIAGNOSIS — E78.00 HYPERCHOLESTEROLEMIA: ICD-10-CM

## 2020-07-23 PROCEDURE — 99213 OFFICE O/P EST LOW 20 MIN: CPT | Performed by: INTERNAL MEDICINE

## 2020-07-23 NOTE — PROGRESS NOTES
[facility] AT [address], [address]  [place], [place], [address] [phone]  [facility]           OUTPATIENT CLINIC FOLLOW UP NOTE    Patient Care Team:  Patient Care Team:  Steve Martinez MD as PCP - General (Family Medicine)  Dubin, Ronald S, MD as PCP - Claims Attributed    Subjective:   Reason for consultation:   Chief Complaint   Patient presents with   • Coronary artery disease involving native coronary artery of        HPI:    Liliya Granado is a 69 y.o. female.  Problem list:  1. CAD  2. Chest pain/NSTEMI 8/2017, 80% proximal LAD stenosis and tandem 50% distal LAD stenoses status post HARINDER x 2  3. Mild systolic heart failure after non-STEMI, EF has since normalized   1. EF 45% with mild hypokinesis of the mid to distal anterior segments and apical segments. 8/2017  2. EF 60% in 2018  4. Essential hypertension  5. Hyperlipidemia with an intolerance to statins, on Repatha    The patient presents for follow-up.    Since last being seen the patient reports that she has been doing well from a cardiac standpoint. She denies chest pain, shortness of breath, lower extremity edema, or palpitations.     She reports taking Repatha.    Has some residual left wrist pain after a traumatic fracture     Review of Systems:  Negative for exertional chest pain, dyspnea, orthopnea, PND, lower extremity edema, palpitations, lightheadedness, syncope.     PFSH:  Patient Active Problem List   Diagnosis   • Coronary artery disease involving native coronary artery of native heart without angina pectoris   • Essential hypertension   • Hypercholesterolemia   • Chronic systolic heart failure (CMS/HCC)         Current Outpatient Medications:   •  amLODIPine (NORVASC) 10 MG tablet, take 1 tablet by mouth once daily, Disp: 120 tablet, Rfl: 0  •  aspirin 81 MG EC tablet, Take 81 mg by mouth Daily., Disp: , Rfl:   •  carvedilol (COREG) 3.125 MG tablet, Take 1 tablet by mouth 2 (Two)  "Times a Day With Meals., Disp: 180 tablet, Rfl: 3  •  escitalopram (LEXAPRO) 10 MG tablet, Take 10 mg by mouth Daily., Disp: , Rfl:   •  Evolocumab with Infusor (Repatha Pushtronex System) 420 MG/3.5ML solution cartridge, Inject 420 mg under the skin into the appropriate area as directed Every 30 (Thirty) Days., Disp: 1 cartridge., Rfl: 11  •  lisinopril (PRINIVIL,ZESTRIL) 40 MG tablet, Take 1 tablet by mouth Daily., Disp: 30 tablet, Rfl: 11  •  nitroglycerin (NITROSTAT) 0.4 MG SL tablet, Place 1 tablet under the tongue Every 5 (Five) Minutes As Needed for Chest Pain. Take no more than 3 doses in 15 minutes., Disp: 25 tablet, Rfl: 11  •  Omega-3 Fatty Acids (FISH OIL) 1000 MG capsule capsule, Take 1,000 mg by mouth Daily With Breakfast., Disp: , Rfl:   •  pantoprazole (PROTONIX) 40 MG EC tablet, Take 40 mg by mouth 2 (Two) Times a Day As Needed., Disp: , Rfl:     Allergies   Allergen Reactions   • Codeine Rash        reports that she has never smoked. She has never used smokeless tobacco.    Family History   Problem Relation Age of Onset   • No Known Problems Mother    • Cancer Father    • Heart failure Sister    • Heart disease Brother    • Heart disease Brother    • Heart disease Brother    • Breast cancer Neg Hx            Objective:   Blood pressure 160/68, pulse 56, height 157.5 cm (62\"), weight 55.3 kg (122 lb).  CONSTITUTIONAL: No acute distress, normal affect  RESPIRATORY: Normal effort. Clear to auscultation bilaterally without wheezing or rales  CARDIOVASCULAR: Soft right carotid bruit not heard today.  Regular rate and rhythm with normal S1 and S2. Without murmur, gallop or rub.  PERIPHERAL VASCULAR: Normal radial pulses bilaterally. There is no lower extremity edema bilaterally.    Labs:  Lab Results   Component Value Date    ALT 25 08/13/2017    AST 23 08/13/2017     Lab Results   Component Value Date    CHOL 107 10/09/2018    TRIG 87 10/09/2018    HDL 40 (L) 10/09/2018    CREATININE 0.70 08/16/2017 "     PCP labs 4/2020: , , HDL 53, LDL 65, AST 22, ALT 21, creatinine 0.74    Diagnostic Data:    Procedures    [facility] 8/15/17  1. There was severe 1 vessel coronary artery disease involving the LAD.  There is mild coronary artery disease of the left main, RCA.  2. There was an 80% discrete stenosis of the proximal LAD followed by 50% tandem stenoses in the mid segment.  The lesions are now status post tandem drug-eluting stents.  A 2.75 x 38 mm drug-eluting stent was placed proximally and a 2.5 x 8 mm drug-eluting stent was placed distally with a brief segment of overlap.  The stents were postdilated with a 2.75 mm noncompliant balloon.  3. Mildly depressed left ventricular ejection fraction 45% with hypokinesis of the mid to distal anterior segments and apical segments    Carotid US 9/2017  1. No significant plaque. No occlusion.  2. No hemodynamically significant stenosis of either RIGHT or LEFT ICA.  3. Antegrade flow noted both vertebral arteries.      TTE 2/2018  · The study is technically good for diagnosis. The study is limited for EF.  · Left ventricular systolic function is normal. Estimated EF = 60%.  · There is no evidence of pericardial effusion.  · Compared to the study of 8/14/2017, the EF is now normal.    TTE 8/14/17  · The study is technically adequate for diagnosis.  · Left ventricular systolic function is low normal. Estimated EF = 45%.  · Left ventricular diastolic dysfunction (grade II) consistent with pseudonormalization.  · Mild aortic valve regurgitation is present.  · Mild mitral valve regurgitation is present  · There is no evidence of pericardial effusion.    Assessment and Plan:   Liliya was seen today for chest pain.    Diagnoses and all orders for this visit:    Coronary artery disease involving native coronary artery of native heart without angina pectoris  Hypercholesterolemia  -CCS class 0, continue ASA, beta-blocker, Repatha  -Prior intolerance to simvastatin, atorvastatin,  rosuvastatin, Zetia   -Repeat lipid panel with PCP annually    Chronic systolic heart failure  -NYHA class I  -Most recent EF 60%  -Continue beta-blocker and lisinopril    Bradycardia  -Stable on low-dose carvedilol.    Essential hypertension  -Elevated little bit in the office today, repeat 138/62.  Patient advised to monitor at home and call us if her systolic averages above 130 mmHg  -Continue amlodipine, Coreg and lisinopril.    Carotid bruit, unspecified laterality  -No significant disease bilaterally on carotid ultrasound 9/2017  -Bruits still very soft    - Return in about 1 year (around 7/23/2021) for Next scheduled follow up with an ECG.      Emilio Sampson MD, MSc, FACC  Interventional Cardiology  [facility] at [facility]

## 2020-10-14 ENCOUNTER — TELEPHONE (OUTPATIENT)
Dept: CARDIOLOGY | Facility: CLINIC | Age: 70
End: 2020-10-14

## 2020-10-15 ENCOUNTER — TELEPHONE (OUTPATIENT)
Dept: CARDIOLOGY | Facility: CLINIC | Age: 70
End: 2020-10-15

## 2020-10-15 NOTE — TELEPHONE ENCOUNTER
LVM advising patient Repatha was approved with [employer]. Requested return call with any further questions.

## 2020-12-04 RX ORDER — CARVEDILOL 3.12 MG/1
3.12 TABLET ORAL 2 TIMES DAILY WITH MEALS
Qty: 180 TABLET | Refills: 3 | Status: SHIPPED | OUTPATIENT
Start: 2020-12-04 | End: 2022-04-04 | Stop reason: SDUPTHER

## 2021-01-29 ENCOUNTER — TELEPHONE (OUTPATIENT)
Dept: CARDIOLOGY | Facility: CLINIC | Age: 71
End: 2021-01-29

## 2021-01-29 NOTE — TELEPHONE ENCOUNTER
----- Message from TERENCE Cano sent at 1/28/2021  3:45 PM EST -----  Can you let the patient know that her lipid panel her PCP looked good. No changes to be made at this time.

## 2021-02-08 ENCOUNTER — TRANSCRIBE ORDERS (OUTPATIENT)
Dept: ADMINISTRATIVE | Facility: HOSPITAL | Age: 71
End: 2021-02-08

## 2021-02-08 DIAGNOSIS — M54.42 CHRONIC MIDLINE LOW BACK PAIN WITH LEFT-SIDED SCIATICA: Primary | ICD-10-CM

## 2021-02-08 DIAGNOSIS — G89.29 CHRONIC MIDLINE LOW BACK PAIN WITH LEFT-SIDED SCIATICA: Primary | ICD-10-CM

## 2021-02-11 ENCOUNTER — HOSPITAL ENCOUNTER (OUTPATIENT)
Dept: MRI IMAGING | Facility: HOSPITAL | Age: 71
Discharge: HOME | End: 2021-02-11
Admitting: NURSE PRACTITIONER

## 2021-02-11 DIAGNOSIS — M54.42 CHRONIC MIDLINE LOW BACK PAIN WITH LEFT-SIDED SCIATICA: ICD-10-CM

## 2021-02-11 DIAGNOSIS — G89.29 CHRONIC MIDLINE LOW BACK PAIN WITH LEFT-SIDED SCIATICA: ICD-10-CM

## 2021-02-11 PROCEDURE — 72148 MRI LUMBAR SPINE W/O DYE: CPT | Performed by: RADIOLOGY

## 2021-02-11 PROCEDURE — 72148 MRI LUMBAR SPINE W/O DYE: CPT

## 2021-03-05 ENCOUNTER — HOSPITAL ENCOUNTER (OUTPATIENT)
Dept: MAMMOGRAPHY | Facility: HOSPITAL | Age: 71
Discharge: HOME | End: 2021-03-05

## 2021-03-05 ENCOUNTER — HOSPITAL ENCOUNTER (OUTPATIENT)
Dept: BONE DENSITY | Facility: HOSPITAL | Age: 71
Discharge: HOME | End: 2021-03-05

## 2021-03-05 DIAGNOSIS — Z12.31 VISIT FOR SCREENING MAMMOGRAM: ICD-10-CM

## 2021-03-05 DIAGNOSIS — Z78.0 ASYMPTOMATIC MENOPAUSAL STATE: ICD-10-CM

## 2021-03-05 PROCEDURE — 77063 BREAST TOMOSYNTHESIS BI: CPT | Performed by: RADIOLOGY

## 2021-03-05 PROCEDURE — 77067 SCR MAMMO BI INCL CAD: CPT | Performed by: RADIOLOGY

## 2021-03-05 PROCEDURE — 77080 DXA BONE DENSITY AXIAL: CPT

## 2021-03-05 PROCEDURE — 77063 BREAST TOMOSYNTHESIS BI: CPT

## 2021-03-05 PROCEDURE — 77080 DXA BONE DENSITY AXIAL: CPT | Performed by: RADIOLOGY

## 2021-03-05 PROCEDURE — 77067 SCR MAMMO BI INCL CAD: CPT

## 2021-03-29 ENCOUNTER — OFFICE VISIT (OUTPATIENT)
Dept: CARDIOLOGY | Facility: CLINIC | Age: 71
End: 2021-03-29

## 2021-03-29 VITALS
HEART RATE: 50 BPM | HEIGHT: 62 IN | DIASTOLIC BLOOD PRESSURE: 70 MMHG | WEIGHT: 126 LBS | OXYGEN SATURATION: 98 % | BODY MASS INDEX: 23.19 KG/M2 | SYSTOLIC BLOOD PRESSURE: 138 MMHG

## 2021-03-29 DIAGNOSIS — I10 ESSENTIAL HYPERTENSION: ICD-10-CM

## 2021-03-29 DIAGNOSIS — R09.89 BRUIT OF RIGHT CAROTID ARTERY: ICD-10-CM

## 2021-03-29 DIAGNOSIS — I50.22 CHRONIC SYSTOLIC HEART FAILURE (HCC): ICD-10-CM

## 2021-03-29 DIAGNOSIS — I25.10 CORONARY ARTERY DISEASE INVOLVING NATIVE CORONARY ARTERY OF NATIVE HEART WITHOUT ANGINA PECTORIS: Primary | ICD-10-CM

## 2021-03-29 DIAGNOSIS — E78.00 HYPERCHOLESTEROLEMIA: ICD-10-CM

## 2021-03-29 PROCEDURE — 93000 ELECTROCARDIOGRAM COMPLETE: CPT | Performed by: INTERNAL MEDICINE

## 2021-03-29 PROCEDURE — 99214 OFFICE O/P EST MOD 30 MIN: CPT | Performed by: INTERNAL MEDICINE

## 2021-03-29 RX ORDER — ERGOCALCIFEROL 1.25 MG/1
CAPSULE ORAL
COMMUNITY
Start: 2021-01-20

## 2021-03-29 NOTE — PROGRESS NOTES
[facility] AT [address], [address]  [place], [place], [address] [phone]  [facility]           OUTPATIENT CLINIC FOLLOW UP NOTE    Patient Care Team:  Patient Care Team:  Kristen Rock APRN as PCP - General (Nurse Practitioner)    Subjective:   Reason for consultation:   Chief Complaint   Patient presents with   • Coronary Artery Disease   • Congestive Heart Failure   • Hypertension       HPI:    Liliya Granado is a 70 y.o. female.  Problem list:  1. CAD  1. Chest pain/NSTEMI 8/2017, 80% proximal LAD stenosis and tandem 50% distal LAD stenoses status post HARINDER x 2  2. Mild systolic heart failure after non-STEMI, EF has since normalized   1. EF 45% with mild hypokinesis of the mid to distal anterior segments and apical segments. 8/2017  2. EF 60% in 2018  3. Essential hypertension  4. Hyperlipidemia with an intolerance to statins, on Repatha    The patient presents for follow-up.    Since last being seen the patient reports that she has been doing well from a cardiac standpoint. She denies chest pain, shortness of breath, lower extremity edema, or palpitations.     Has been having some back pain, diffuse, had an MRI, seeing Dr. Lowe at [facility] for degenerative disc disease soon     Review of Systems:  Positive for back pain  Negative for exertional chest pain, dyspnea, orthopnea, PND, lower extremity edema, palpitations, lightheadedness, syncope.     PFSH:  Patient Active Problem List   Diagnosis   • Coronary artery disease involving native coronary artery of native heart without angina pectoris   • Essential hypertension   • Hypercholesterolemia   • Chronic systolic heart failure (CMS/HCC)         Current Outpatient Medications:   •  amLODIPine (NORVASC) 10 MG tablet, take 1 tablet by mouth once daily, Disp: 120 tablet, Rfl: 0  •  aspirin 81 MG EC tablet, Take 81 mg by mouth Daily., Disp: , Rfl:   •  carvedilol (COREG) 3.125 MG tablet, Take 1 tablet  "by mouth 2 (Two) Times a Day With Meals., Disp: 180 tablet, Rfl: 3  •  escitalopram (LEXAPRO) 10 MG tablet, Take 10 mg by mouth Daily., Disp: , Rfl:   •  Evolocumab with Infusor (Repatha Pushtronex System) 420 MG/3.5ML solution cartridge, Inject 420 mg under the skin into the appropriate area as directed Every 30 (Thirty) Days., Disp: 1 cartridge., Rfl: 11  •  lisinopril (PRINIVIL,ZESTRIL) 40 MG tablet, Take 1 tablet by mouth Daily., Disp: 30 tablet, Rfl: 11  •  nitroglycerin (NITROSTAT) 0.4 MG SL tablet, Place 1 tablet under the tongue Every 5 (Five) Minutes As Needed for Chest Pain. Take no more than 3 doses in 15 minutes., Disp: 25 tablet, Rfl: 11  •  Omega-3 Fatty Acids (FISH OIL) 1000 MG capsule capsule, Take 1,000 mg by mouth Daily With Breakfast., Disp: , Rfl:   •  pantoprazole (PROTONIX) 40 MG EC tablet, Take 40 mg by mouth 2 (Two) Times a Day As Needed., Disp: , Rfl:   •  vitamin D (ERGOCALCIFEROL) 1.25 MG (61926 UT) capsule capsule, , Disp: , Rfl:     Allergies   Allergen Reactions   • Codeine Rash        reports that she has never smoked. She has never used smokeless tobacco.    Family History   Problem Relation Age of Onset   • No Known Problems Mother    • Cancer Father    • Heart failure Sister    • Heart disease Brother    • Heart disease Brother    • Heart disease Brother    • Breast cancer Neg Hx            Objective:   Blood pressure 138/70, pulse 50, height 157.5 cm (62.01\"), weight 57.2 kg (126 lb), SpO2 98 %.  CONSTITUTIONAL: No acute distress, normal affect  RESPIRATORY: Normal effort. Clear to auscultation bilaterally without wheezing or rales  CARDIOVASCULAR: Right carotid bruit present.  Regular rate and rhythm with normal S1 and S2. Without murmur, gallop or rub.  PERIPHERAL VASCULAR: Normal radial pulses bilaterally. There is no lower extremity edema bilaterally.    3/2021 exam: 2+ DP pulses bilaterally    Labs:  Lab Results   Component Value Date    ALT 25 08/13/2017    AST 23 08/13/2017 "     Lab Results   Component Value Date    CHOL 107 10/09/2018    TRIG 87 10/09/2018    HDL 40 (L) 10/09/2018    CREATININE 0.70 08/16/2017     PCP labs 4/2020: , , HDL 53, LDL 65, AST 22, ALT 21, creatinine 0.74    PCP labs 1/2021: Creatinine 0.85, AST 21, ALT 18, , TG 95, HDL 57, LDL 50    Diagnostic Data:      ECG 12 Lead    Date/Time: 3/29/2021 10:14 AM  Performed by: Emilio Sampson MD  Authorized by: Emilio Sampson MD   Comparison: compared with previous ECG from 9/18/2017  Comparison to previous ECG: T waves have normalized  Rhythm: sinus bradycardia  Conduction: 1st degree AV block  Other findings: left ventricular hypertrophy  Comments: Heart rate 49, QRS 94, QTc 377            [facility] 8/15/17  1. There was severe 1 vessel coronary artery disease involving the LAD.  There is mild coronary artery disease of the left main, RCA.  2. There was an 80% discrete stenosis of the proximal LAD followed by 50% tandem stenoses in the mid segment.  The lesions are now status post tandem drug-eluting stents.  A 2.75 x 38 mm drug-eluting stent was placed proximally and a 2.5 x 8 mm drug-eluting stent was placed distally with a brief segment of overlap.  The stents were postdilated with a 2.75 mm noncompliant balloon.  3. Mildly depressed left ventricular ejection fraction 45% with hypokinesis of the mid to distal anterior segments and apical segments    Carotid US 9/2017  1. No significant plaque. No occlusion.  2. No hemodynamically significant stenosis of either RIGHT or LEFT ICA.  3. Antegrade flow noted both vertebral arteries.      TTE 2/2018  · The study is technically good for diagnosis. The study is limited for EF.  · Left ventricular systolic function is normal. Estimated EF = 60%.  · There is no evidence of pericardial effusion.  · Compared to the study of 8/14/2017, the EF is now normal.    TTE 8/14/17  · The study is technically adequate for diagnosis.  · Left ventricular systolic function is low  normal. Estimated EF = 45%.  · Left ventricular diastolic dysfunction (grade II) consistent with pseudonormalization.  · Mild aortic valve regurgitation is present.  · Mild mitral valve regurgitation is present  · There is no evidence of pericardial effusion.    Assessment and Plan:   Liliya was seen today for chest pain.    Diagnoses and all orders for this visit:    Coronary artery disease involving native coronary artery of native heart without angina pectoris  Hypercholesterolemia  -CCS class 0  -Continue ASA, beta-blocker, Repatha  -Prior intolerance to simvastatin, atorvastatin, rosuvastatin, Zetia   -Repeat lipid panel with PCP annually    Chronic systolic heart failure  -NYHA class I  -Most recent EF 60%  -Continue beta-blocker and lisinopril    Bradycardia  -Stable on low-dose carvedilol.    Essential hypertension  -Continue amlodipine, Coreg and lisinopril.    Carotid bruit, unspecified laterality  -Repeat carotid duplex ultrasound    - Return in about 1 year (around 3/29/2022) for Next scheduled follow-up with an ECG.      Emilio Sampson MD, MSc, [facility]  Interventional Cardiology  [facility]

## 2021-04-05 ENCOUNTER — HOSPITAL ENCOUNTER (OUTPATIENT)
Dept: CARDIOLOGY | Facility: HOSPITAL | Age: 71
Discharge: HOME | End: 2021-04-05
Admitting: INTERNAL MEDICINE

## 2021-04-05 DIAGNOSIS — R09.89 BRUIT OF RIGHT CAROTID ARTERY: ICD-10-CM

## 2021-04-05 PROCEDURE — 93880 EXTRACRANIAL BILAT STUDY: CPT

## 2021-04-05 PROCEDURE — 93880 EXTRACRANIAL BILAT STUDY: CPT | Performed by: RADIOLOGY

## 2021-04-06 ENCOUNTER — TELEPHONE (OUTPATIENT)
Dept: CARDIOLOGY | Facility: CLINIC | Age: 71
End: 2021-04-06

## 2021-04-06 NOTE — TELEPHONE ENCOUNTER
Patient contacted to review carotid ultrasound results. All questions answered at this time, pt verbalizes understanding.

## 2021-04-06 NOTE — TELEPHONE ENCOUNTER
----- Message from Emilio Sampson MD sent at 4/5/2021  5:05 PM EDT -----  Please let the patient know her carotid ultrasound showed no significant plaque.  No recommended additional changes at this time

## 2021-04-29 ENCOUNTER — OFFICE VISIT (OUTPATIENT)
Dept: NEUROSURGERY | Facility: CLINIC | Age: 71
End: 2021-04-29

## 2021-04-29 VITALS
TEMPERATURE: 97.5 F | DIASTOLIC BLOOD PRESSURE: 64 MMHG | SYSTOLIC BLOOD PRESSURE: 136 MMHG | WEIGHT: 125 LBS | HEIGHT: 62 IN | BODY MASS INDEX: 23 KG/M2

## 2021-04-29 DIAGNOSIS — M51.36 DEGENERATIVE DISC DISEASE, LUMBAR: Primary | ICD-10-CM

## 2021-04-29 PROCEDURE — 99203 OFFICE O/P NEW LOW 30 MIN: CPT | Performed by: NEUROLOGICAL SURGERY

## 2021-04-29 RX ORDER — NABUMETONE 750 MG/1
750 TABLET, FILM COATED ORAL 2 TIMES DAILY
Qty: 60 TABLET | Refills: 0 | Status: SHIPPED | OUTPATIENT
Start: 2021-04-29

## 2021-04-29 RX ORDER — METHOCARBAMOL 750 MG/1
750 TABLET, FILM COATED ORAL NIGHTLY
Qty: 30 TABLET | Refills: 0 | Status: SHIPPED | OUTPATIENT
Start: 2021-04-29 | End: 2021-05-29

## 2021-09-02 ENCOUNTER — APPOINTMENT (OUTPATIENT)
Dept: CT IMAGING | Facility: HOSPITAL | Age: 71
End: 2021-09-02

## 2021-09-02 ENCOUNTER — HOSPITAL ENCOUNTER (EMERGENCY)
Facility: HOSPITAL | Age: 71
Discharge: HOME | End: 2021-09-02
Attending: STUDENT IN AN ORGANIZED HEALTH CARE EDUCATION/TRAINING PROGRAM | Admitting: STUDENT IN AN ORGANIZED HEALTH CARE EDUCATION/TRAINING PROGRAM

## 2021-09-02 VITALS
WEIGHT: 126 LBS | HEART RATE: 60 BPM | TEMPERATURE: 98.3 F | RESPIRATION RATE: 16 BRPM | HEIGHT: 62 IN | SYSTOLIC BLOOD PRESSURE: 171 MMHG | OXYGEN SATURATION: 97 % | BODY MASS INDEX: 23.19 KG/M2 | DIASTOLIC BLOOD PRESSURE: 68 MMHG

## 2021-09-02 DIAGNOSIS — S09.90XA INJURY OF HEAD, INITIAL ENCOUNTER: Primary | ICD-10-CM

## 2021-09-02 PROCEDURE — 99282 EMERGENCY DEPT VISIT SF MDM: CPT

## 2021-09-02 PROCEDURE — 70450 CT HEAD/BRAIN W/O DYE: CPT

## 2021-09-02 NOTE — ED NOTES
MEDICAL SCREENING:    Reason for Visit: head injury     Patient initially seen in triage.  The patient was advised further evaluation and diagnostic testing will be needed, some of the treatment and testing will be initiated in the lobby in order to begin the process.  The patient will be returned to the waiting area for the time being and possibly be re-assessed by a subsequent ED provider.  The patient will be brought back to the treatment area in as timely manner as possible.       Crystal Barber, APRN  09/02/21 1723

## 2021-09-02 NOTE — ED PROVIDER NOTES
Subjective   70-year-old female with past medical history of GERD and hypertension presents to the emergency room after a head injury.  Patient states she was at the [email or web address] when they were having a yard sale and the wind blew throwing a tent which knocked her down.  She states that she did strike her head on concrete and did have a positive loss of consciousness.  She said that she was not very long.  She denies taking any blood thinners.  She did have a severe headache, but states this is now subsiding.  She denies nausea, vomiting, or increased drowsiness.  She also states that she has a open wound to the back of her head where she fell.  She denies any neck or back pain.  Denies any other complaints or concerns at this time.      History provided by:  Patient   used: No        Review of Systems   Constitutional: Negative.  Negative for fever.   HENT: Negative.    Respiratory: Negative.    Cardiovascular: Negative.  Negative for chest pain.   Gastrointestinal: Negative.  Negative for abdominal pain.   Endocrine: Negative.    Genitourinary: Negative.  Negative for dysuria.   Skin: Positive for wound.   Neurological: Negative.         (+) head injury with LOC   Psychiatric/Behavioral: Negative.    All other systems reviewed and are negative.      Past Medical History:   Diagnosis Date   • GERD (gastroesophageal reflux disease)    • Hypertension    • Wrist fracture 12/13/2019       Allergies   Allergen Reactions   • Codeine Rash       Past Surgical History:   Procedure Laterality Date   • CARDIAC CATHETERIZATION N/A 8/15/2017    Procedure: Left Heart Cath;  Surgeon: Emilio Sampson MD;  Location: [facility] INVASIVE LOCATION;  Service:    • CHOLECYSTECTOMY     • HYSTERECTOMY         Family History   Problem Relation Age of Onset   • No Known Problems Mother    • Cancer Father    • Heart failure Sister    • Heart disease Brother    • Heart disease Brother    • Heart disease Brother    •  Breast cancer Neg Hx        Social History     Socioeconomic History   • Marital status:      Spouse name: Not on file   • Number of children: Not on file   • Years of education: Not on file   • Highest education level: Not on file   Tobacco Use   • Smoking status: Never Smoker   • Smokeless tobacco: Never Used   Substance and Sexual Activity   • Alcohol use: No   • Drug use: No           Objective   Physical Exam  Vitals and nursing note reviewed.   Constitutional:       General: She is not in acute distress.     Appearance: She is well-developed. She is not diaphoretic.   HENT:      Head: Normocephalic and atraumatic.      Right Ear: External ear normal.      Left Ear: External ear normal.      Nose: Nose normal.   Eyes:      Conjunctiva/sclera: Conjunctivae normal.      Pupils: Pupils are equal, round, and reactive to light.   Neck:      Vascular: No JVD.      Trachea: No tracheal deviation.   Cardiovascular:      Rate and Rhythm: Normal rate and regular rhythm.      Heart sounds: Normal heart sounds. No murmur heard.     Pulmonary:      Effort: Pulmonary effort is normal. No respiratory distress.      Breath sounds: Normal breath sounds. No wheezing.   Abdominal:      General: Bowel sounds are normal.      Palpations: Abdomen is soft.      Tenderness: There is no abdominal tenderness.   Musculoskeletal:         General: No deformity. Normal range of motion.      Cervical back: Normal range of motion and neck supple.   Skin:     General: Skin is warm and dry.      Coloration: Skin is not pale.      Findings: Abrasion present. No erythema or rash.          Neurological:      General: No focal deficit present.      Mental Status: She is alert and oriented to person, place, and time.      GCS: GCS eye subscore is 4. GCS verbal subscore is 5. GCS motor subscore is 6.      Cranial Nerves: Cranial nerves are intact. No cranial nerve deficit.      Sensory: Sensation is intact.      Motor: Motor function is  intact.      Coordination: Coordination is intact.      Gait: Gait is intact.   Psychiatric:         Behavior: Behavior normal.         Thought Content: Thought content normal.         Procedures           ED Course  ED Course as of Sep 02 2018   Thu Sep 02, 2021   1949 CT Head Without Contrast [TK]      ED Course User Index  [TK] Tanesha Clarke PA-C                                           MDM  Number of Diagnoses or Management Options  Injury of head, initial encounter: new and requires workup     Amount and/or Complexity of Data Reviewed  Tests in the radiology section of CPT®: ordered and reviewed    Risk of Complications, Morbidity, and/or Mortality  Presenting problems: moderate  Diagnostic procedures: moderate  Management options: minimal    Patient Progress  Patient progress: stable      Final diagnoses:   Injury of head, initial encounter       ED Disposition  ED Disposition     ED Disposition Condition Comment    Discharge Stable           Kristen Rock, APRN  [address] [id number]  [phone]    In 2 days           Medication List      No changes were made to your prescriptions during this visit.          Tanesha Clarke PA-C  09/02/21 2018

## 2021-10-12 RX ORDER — EVOLOCUMAB 420 MG/3.5
420 KIT SUBCUTANEOUS
Qty: 3 CARTRIDGE | Refills: 11 | Status: SHIPPED | OUTPATIENT
Start: 2021-10-12 | End: 2022-01-03 | Stop reason: SDUPTHER

## 2022-01-03 RX ORDER — EVOLOCUMAB 420 MG/3.5
420 KIT SUBCUTANEOUS
Qty: 3 CARTRIDGE | Refills: 11 | Status: SHIPPED | OUTPATIENT
Start: 2022-01-03 | End: 2022-04-04 | Stop reason: SDUPTHER

## 2022-03-18 NOTE — TELEPHONE ENCOUNTER
LVM requesting return call regarding Repatha and insurance approval. Will await.    Per Dr. Molly García refer pt to Nutrition  Dr. Ralph Damian.   Pt notified

## 2022-04-04 ENCOUNTER — OFFICE VISIT (OUTPATIENT)
Dept: CARDIOLOGY | Facility: CLINIC | Age: 72
End: 2022-04-04

## 2022-04-04 VITALS
OXYGEN SATURATION: 98 % | BODY MASS INDEX: 23 KG/M2 | DIASTOLIC BLOOD PRESSURE: 58 MMHG | HEIGHT: 62 IN | SYSTOLIC BLOOD PRESSURE: 122 MMHG | HEART RATE: 59 BPM | WEIGHT: 125 LBS

## 2022-04-04 DIAGNOSIS — I50.22 CHRONIC SYSTOLIC HEART FAILURE: ICD-10-CM

## 2022-04-04 DIAGNOSIS — I10 ESSENTIAL HYPERTENSION: ICD-10-CM

## 2022-04-04 DIAGNOSIS — R09.89 BRUIT OF RIGHT CAROTID ARTERY: ICD-10-CM

## 2022-04-04 DIAGNOSIS — I25.10 CORONARY ARTERY DISEASE INVOLVING NATIVE CORONARY ARTERY OF NATIVE HEART WITHOUT ANGINA PECTORIS: Primary | ICD-10-CM

## 2022-04-04 PROCEDURE — 93000 ELECTROCARDIOGRAM COMPLETE: CPT | Performed by: INTERNAL MEDICINE

## 2022-04-04 PROCEDURE — 99214 OFFICE O/P EST MOD 30 MIN: CPT | Performed by: INTERNAL MEDICINE

## 2022-04-04 RX ORDER — NITROGLYCERIN 0.4 MG/1
0.4 TABLET SUBLINGUAL
Qty: 25 TABLET | Refills: 2 | Status: SHIPPED | OUTPATIENT
Start: 2022-04-04 | End: 2023-04-03 | Stop reason: SDUPTHER

## 2022-04-04 RX ORDER — EVOLOCUMAB 420 MG/3.5
420 KIT SUBCUTANEOUS
Qty: 3 CARTRIDGE | Refills: 4 | Status: SHIPPED | OUTPATIENT
Start: 2022-04-04 | End: 2023-04-03 | Stop reason: SDUPTHER

## 2022-04-04 RX ORDER — AMLODIPINE BESYLATE 10 MG/1
10 TABLET ORAL DAILY
Qty: 90 TABLET | Refills: 3 | Status: SHIPPED | OUTPATIENT
Start: 2022-04-04 | End: 2023-04-03 | Stop reason: SDUPTHER

## 2022-04-04 RX ORDER — CARVEDILOL 3.12 MG/1
3.12 TABLET ORAL 2 TIMES DAILY WITH MEALS
Qty: 180 TABLET | Refills: 3 | Status: SHIPPED | OUTPATIENT
Start: 2022-04-04 | End: 2023-04-03 | Stop reason: SDUPTHER

## 2022-04-04 RX ORDER — ASPIRIN 81 MG/1
81 TABLET ORAL DAILY
Qty: 90 TABLET | Refills: 3 | Status: SHIPPED | OUTPATIENT
Start: 2022-04-04 | End: 2023-04-03 | Stop reason: SDUPTHER

## 2022-04-04 RX ORDER — LISINOPRIL 40 MG/1
40 TABLET ORAL DAILY
Qty: 90 TABLET | Refills: 3 | Status: SHIPPED | OUTPATIENT
Start: 2022-04-04 | End: 2023-04-03 | Stop reason: SDUPTHER

## 2022-04-04 NOTE — PROGRESS NOTES
[facility] AT [address], [address]  [place], [place], [address] [phone]  [facility]           OUTPATIENT CLINIC FOLLOW UP NOTE    Patient Care Team:  Patient Care Team:  Kristen Rock APRN as PCP - General (Nurse Practitioner)    Subjective:   Reason for consultation:   Chief Complaint   Patient presents with   • Coronary artery disease involving native coronary artery of       HPI:    Liliya Granado is a 71 y.o. female.  Problem list:  1. CAD  1. Chest pain/NSTEMI 8/2017, 80% proximal LAD stenosis and tandem 50% distal LAD stenoses status post HARINDER x 2  2. Mild systolic heart failure after non-STEMI, EF has since normalized   1. EF 45% with mild hypokinesis of the mid to distal anterior segments and apical segments. 8/2017  2. EF 60% in 2018  3. Essential hypertension  4. Hyperlipidemia with an intolerance to statins, on Repatha  5. Carotid bruit   1. Without significant stenosis by ultrasound    The patient presents for follow-up.    Since last being seen the patient reports that she has been doing well from a cardiac standpoint. She denies chest pain, shortness of breath, lower extremity edema, or palpitations.     Chronic back discomfort, previously referred to [facility]. [facility] at [facility] for [facility] disc disease      Review of Systems:  As noted in the HPI    PFSH:  Patient Active Problem List   Diagnosis   • Coronary artery disease involving native coronary artery of native heart without angina pectoris   • Essential hypertension   • Hypercholesterolemia   • Chronic systolic heart failure (HCC)         Current Outpatient Medications:   •  amLODIPine (NORVASC) 10 MG tablet, Take 1 tablet by mouth Daily., Disp: 90 tablet, Rfl: 3  •  aspirin 81 MG EC tablet, Take 1 tablet by mouth Daily., Disp: 90 tablet, Rfl: 3  •  carvedilol (COREG) 3.125 MG tablet, Take 1 tablet by mouth 2 (Two) Times a Day With Meals., Disp: 180 tablet, Rfl: 3  •  escitalopram  "(LEXAPRO) 10 MG tablet, Take 10 mg by mouth Daily. Pt takes half tablet prn, Disp: , Rfl:   •  Evolocumab with Infusor (Repatha Pushtronex System) solution cartridge, Inject 3.5 mL under the skin into the appropriate area as directed Every 30 (Thirty) Days., Disp: 3 cartridge., Rfl: 4  •  lisinopril (PRINIVIL,ZESTRIL) 40 MG tablet, Take 1 tablet by mouth Daily., Disp: 90 tablet, Rfl: 3  •  nabumetone (RELAFEN) 750 MG tablet, Take 1 tablet by mouth 2 (Two) Times a Day. (Patient taking differently: Take 750 mg by mouth 2 (Two) Times a Day As Needed.), Disp: 60 tablet, Rfl: 0  •  nitroglycerin (NITROSTAT) 0.4 MG SL tablet, Place 1 tablet under the tongue Every 5 (Five) Minutes As Needed for Chest Pain. Take no more than 3 doses in 15 minutes., Disp: 25 tablet, Rfl: 2  •  Omega-3 Fatty Acids (FISH OIL) 1000 MG capsule capsule, Take 1,000 mg by mouth Daily With Breakfast., Disp: , Rfl:   •  pantoprazole (PROTONIX) 40 MG EC tablet, Take 40 mg by mouth 2 (Two) Times a Day As Needed., Disp: , Rfl:   •  vitamin D (ERGOCALCIFEROL) 1.25 MG (95219 UT) capsule capsule, Every 7 (Seven) Days., Disp: , Rfl:     Allergies   Allergen Reactions   • Codeine Rash        reports that she has never smoked. She has never used smokeless tobacco.    Family History   Problem Relation Age of Onset   • No Known Problems Mother    • Cancer Father    • Heart failure Sister    • Heart disease Brother    • Heart disease Brother    • Heart disease Brother    • Breast cancer Neg Hx            Objective:   Blood pressure 122/58, pulse 59, height 157.5 cm (62\"), weight 56.7 kg (125 lb), SpO2 98 %.  CONSTITUTIONAL: No acute distress, normal affect  RESPIRATORY: Normal effort. Clear to auscultation bilaterally without wheezing or rales  CARDIOVASCULAR: Right carotid bruit present.  Regular rate and rhythm with normal S1 and S2. Without murmur, gallop or rub.  PERIPHERAL VASCULAR: Normal radial pulse    3/2021 exam: 2+ DP pulses bilaterally    Labs:  Lab " Results   Component Value Date    ALT 25 08/13/2017    AST 23 08/13/2017     Lab Results   Component Value Date    CHOL 107 10/09/2018    TRIG 87 10/09/2018    HDL 40 (L) 10/09/2018    CREATININE 0.70 08/16/2017     PCP labs 4/2020: , , HDL 53, LDL 65, AST 22, ALT 21, creatinine 0.74    PCP labs 1/2021: Creatinine 0.85, AST 21, ALT 18, , TG 95, HDL 57, LDL 50    Diagnostic Data:      ECG 12 Lead    Date/Time: 4/4/2022 10:14 AM  Performed by: Emilio aSmpson MD  Authorized by: Emilio Sampson MD   Comparison: compared with previous ECG from 3/29/2021  Similar to previous ECG  Rhythm: sinus rhythm  Conduction: 1st degree AV block  Comments: LVH            [facility] 8/15/17  1. There was severe 1 vessel coronary artery disease involving the LAD.  There is mild coronary artery disease of the left main, RCA.  2. There was an 80% discrete stenosis of the proximal LAD followed by 50% tandem stenoses in the mid segment.  The lesions are now status post tandem drug-eluting stents.  A 2.75 x 38 mm drug-eluting stent was placed proximally and a 2.5 x 8 mm drug-eluting stent was placed distally with a brief segment of overlap.  The stents were postdilated with a 2.75 mm noncompliant balloon.  3. Mildly depressed left ventricular ejection fraction 45% with hypokinesis of the mid to distal anterior segments and apical segments    TTE 8/14/17  · The study is technically adequate for diagnosis.  · Left ventricular systolic function is low normal. Estimated EF = 45%.  · Left ventricular diastolic dysfunction (grade II) consistent with pseudonormalization.  · Mild aortic valve regurgitation is present.  · Mild mitral valve regurgitation is present  · There is no evidence of pericardial effusion.    Results for orders placed during the hospital encounter of 02/12/18    Adult Transthoracic Echo Limited W/ Cont if Necessary Per Protocol    Interpretation Summary  · The study is technically good for diagnosis. The study is  limited for EF.  · Left ventricular systolic function is normal. Estimated EF = 60%.  · There is no evidence of pericardial effusion.    Compared to the study of 8/14/2017, the EF is now normal.    Carotid US 9/2017  1. No significant plaque. No occlusion.  2. No hemodynamically significant stenosis oflow noted both vertebral arteries.     either RIGHT or LEFT ICA.    Carotid 3/2021  No hemodynamically significant plaques or stenoses were  demonstrated in the carotids. Vertebral artery flows are antegrade.    Assessment and Plan:     Coronary artery disease involving native coronary artery of native heart without angina pectoris  Hypercholesterolemia  -CCS class 0  -Continue ASA, beta-blocker, Repatha  -Prior intolerance to simvastatin, atorvastatin, rosuvastatin, Zetia     -Insurance changed; needs help getting Repatha again.  We will look into it    -Repeat lipid panel with PCP annually  -Has not had labs yet this year.  Recommended having them drawn after she is back on Repatha for a couple months    Chronic systolic heart failure  -NYHA class I  -Most recent EF 60%  -Continue beta-blocker and lisinopril    Bradycardia  -Stable on low-dose carvedilol.    Essential hypertension  -Continue amlodipine, carvedilol and lisinopril.    Carotid bruit, unspecified laterality  -Repeat carotid duplex ultrasound    - Return in about 1 year (around 4/4/2023) for Next scheduled follow-up with an ECG.      Emilio Sampson MD, MSc, [facility]  Interventional Cardiology  [facility] at [facility]

## 2023-04-03 ENCOUNTER — TELEPHONE (OUTPATIENT)
Dept: CARDIOLOGY | Facility: CLINIC | Age: 73
End: 2023-04-03

## 2023-04-03 ENCOUNTER — OFFICE VISIT (OUTPATIENT)
Dept: CARDIOLOGY | Facility: CLINIC | Age: 73
End: 2023-04-03
Payer: MEDICARE

## 2023-04-03 VITALS
WEIGHT: 126 LBS | BODY MASS INDEX: 23.19 KG/M2 | HEIGHT: 62 IN | HEART RATE: 52 BPM | OXYGEN SATURATION: 98 % | SYSTOLIC BLOOD PRESSURE: 138 MMHG | DIASTOLIC BLOOD PRESSURE: 66 MMHG

## 2023-04-03 DIAGNOSIS — I10 ESSENTIAL HYPERTENSION: ICD-10-CM

## 2023-04-03 DIAGNOSIS — I25.10 CORONARY ARTERY DISEASE INVOLVING NATIVE CORONARY ARTERY OF NATIVE HEART WITHOUT ANGINA PECTORIS: Primary | ICD-10-CM

## 2023-04-03 DIAGNOSIS — I50.22 CHRONIC SYSTOLIC HEART FAILURE: ICD-10-CM

## 2023-04-03 PROCEDURE — 99214 OFFICE O/P EST MOD 30 MIN: CPT | Performed by: INTERNAL MEDICINE

## 2023-04-03 PROCEDURE — 3075F SYST BP GE 130 - 139MM HG: CPT | Performed by: INTERNAL MEDICINE

## 2023-04-03 PROCEDURE — 3078F DIAST BP <80 MM HG: CPT | Performed by: INTERNAL MEDICINE

## 2023-04-03 PROCEDURE — 93000 ELECTROCARDIOGRAM COMPLETE: CPT | Performed by: INTERNAL MEDICINE

## 2023-04-03 RX ORDER — NITROGLYCERIN 0.4 MG/1
0.4 TABLET SUBLINGUAL
Qty: 25 TABLET | Refills: 2 | Status: SHIPPED | OUTPATIENT
Start: 2023-04-03

## 2023-04-03 RX ORDER — LISINOPRIL 40 MG/1
40 TABLET ORAL DAILY
Qty: 90 TABLET | Refills: 3 | Status: SHIPPED | OUTPATIENT
Start: 2023-04-03

## 2023-04-03 RX ORDER — EVOLOCUMAB 420 MG/3.5
420 KIT SUBCUTANEOUS
Qty: 6 ML | Refills: 3 | Status: SHIPPED | OUTPATIENT
Start: 2023-04-03

## 2023-04-03 RX ORDER — AMLODIPINE BESYLATE 10 MG/1
10 TABLET ORAL DAILY
Qty: 90 TABLET | Refills: 3 | Status: SHIPPED | OUTPATIENT
Start: 2023-04-03

## 2023-04-03 RX ORDER — ASPIRIN 81 MG/1
81 TABLET ORAL DAILY
Qty: 90 TABLET | Refills: 3 | Status: SHIPPED | OUTPATIENT
Start: 2023-04-03

## 2023-04-03 RX ORDER — CARVEDILOL 3.12 MG/1
3.12 TABLET ORAL 2 TIMES DAILY WITH MEALS
Qty: 180 TABLET | Refills: 3 | Status: SHIPPED | OUTPATIENT
Start: 2023-04-03

## 2023-04-03 NOTE — PROGRESS NOTES
[facility] AT [address], [address]  [place], [place], [address] [phone]  WWW[facility]           OUTPATIENT CLINIC FOLLOW UP NOTE    Patient Care Team:  Patient Care Team:  Kristen Rock APRN as PCP - General (Nurse Practitioner)  Emilio Sampson MD as Consulting Physician (Cardiology)    Subjective:   Reason for consultation:   Chief Complaint   Patient presents with   • Coronary artery disease involving native coronary artery        HPI:    Liliya Granado is a 72 y.o. female.  Problem list:  1. CAD  1. Chest pain/NSTEMI 8/2017, 80% proximal LAD stenosis and tandem 50% distal LAD stenoses status post HARINDER x 2  2. Mild systolic heart failure after non-STEMI, EF has since normalized   1. EF 45% with mild hypokinesis of the mid to distal anterior segments and apical segments. 8/2017  2. EF 60% in 2018  3. Essential hypertension  4. Hyperlipidemia with an intolerance to statins, on Repatha  5. Carotid bruit   1. Without significant stenosis by ultrasound    The patient presents for follow-up.    Since last being seen the patient reports that she has been doing well from a cardiac standpoint. She denies chest pain, shortness of breath, lower extremity edema, or palpitations.     Chronic back discomfort, previously referred to Dr. Lowe at [facility] for degenerative disc disease.  Current plan is for medical therapy     Review of Systems:  As noted in the HPI    PFSH:  Patient Active Problem List   Diagnosis   • Coronary artery disease involving native coronary artery of native heart without angina pectoris   • Essential hypertension   • Hypercholesterolemia   • Chronic systolic heart failure         Current Outpatient Medications:   •  amLODIPine (NORVASC) 10 MG tablet, Take 1 tablet by mouth Daily., Disp: 90 tablet, Rfl: 3  •  aspirin 81 MG EC tablet, Take 1 tablet by mouth Daily., Disp: 90 tablet, Rfl: 3  •  carvedilol (COREG) 3.125 MG tablet, Take 1 tablet  "by mouth 2 (Two) Times a Day With Meals., Disp: 180 tablet, Rfl: 3  •  escitalopram (LEXAPRO) 10 MG tablet, Take 1 tablet by mouth Daily. Pt takes half tablet prn, Disp: , Rfl:   •  Evolocumab with Infusor (Repatha Pushtronex System) solution cartridge, Inject 3.5 mL under the skin into the appropriate area as directed Every 30 (Thirty) Days., Disp: 3 cartridge., Rfl: 4  •  lisinopril (PRINIVIL,ZESTRIL) 40 MG tablet, Take 1 tablet by mouth Daily., Disp: 90 tablet, Rfl: 3  •  nabumetone (RELAFEN) 750 MG tablet, Take 1 tablet by mouth 2 (Two) Times a Day. (Patient taking differently: Take 1 tablet by mouth 2 (Two) Times a Day As Needed.), Disp: 60 tablet, Rfl: 0  •  nitroglycerin (NITROSTAT) 0.4 MG SL tablet, Place 1 tablet under the tongue Every 5 (Five) Minutes As Needed for Chest Pain. Take no more than 3 doses in 15 minutes., Disp: 25 tablet, Rfl: 2  •  Omega-3 Fatty Acids (FISH OIL) 1000 MG capsule capsule, Take 1 capsule by mouth Daily With Breakfast., Disp: , Rfl:   •  pantoprazole (PROTONIX) 40 MG EC tablet, Take 1 tablet by mouth 2 (Two) Times a Day As Needed., Disp: , Rfl:   •  vitamin D (ERGOCALCIFEROL) 1.25 MG (77544 UT) capsule capsule, Every 7 (Seven) Days., Disp: , Rfl:     Allergies   Allergen Reactions   • Codeine Rash        reports that she has never smoked. She has never been exposed to tobacco smoke. She has never used smokeless tobacco.    Family History   Problem Relation Age of Onset   • No Known Problems Mother    • Cancer Father    • Heart failure Sister    • Heart disease Brother    • Heart disease Brother    • Heart disease Brother    • Breast cancer Neg Hx            Objective:   Blood pressure 138/66, pulse 52, height 157.5 cm (62\"), weight 57.2 kg (126 lb), SpO2 98 %.  CONSTITUTIONAL: No acute distress, normal affect  RESPIRATORY: Normal effort. Clear to auscultation bilaterally without wheezing or rales  CARDIOVASCULAR: Right carotid bruit not audible today.  Regular rate and rhythm " with normal S1 and S2. Without murmur  PERIPHERAL VASCULAR: Normal radial pulse    3/2021 exam: 2+ DP pulses bilaterally    Labs:  Lab Results   Component Value Date    ALT 25 08/13/2017    AST 23 08/13/2017     Lab Results   Component Value Date    CHOL 107 10/09/2018    TRIG 87 10/09/2018    HDL 40 (L) 10/09/2018    CREATININE 0.70 08/16/2017     PCP labs 4/2020: , , HDL 53, LDL 65, AST 22, ALT 21, creatinine 0.74    PCP labs 1/2021: Creatinine 0.85, AST 21, ALT 18, , TG 95, HDL 57, LDL 50    Diagnostic Data:      ECG 12 Lead    Date/Time: 4/3/2023 10:54 AM  Performed by: Emilio Sampson MD  Authorized by: Emilio Sampson MD   Comparison: compared with previous ECG from 4/4/2022  Similar to previous ECG  Comparison to previous ECG: LVH, first-degree AVB  Rhythm: sinus bradycardia            [facility] 8/15/17  1. There was severe 1 vessel coronary artery disease involving the LAD.  There is mild coronary artery disease of the left main, RCA.  2. There was an 80% discrete stenosis of the proximal LAD followed by 50% tandem stenoses in the mid segment.  The lesions are now status post tandem drug-eluting stents.  A 2.75 x 38 mm drug-eluting stent was placed proximally and a 2.5 x 8 mm drug-eluting stent was placed distally with a brief segment of overlap.  The stents were postdilated with a 2.75 mm noncompliant balloon.  3. Mildly depressed left ventricular ejection fraction 45% with hypokinesis of the mid to distal anterior segments and apical segments    TTE 8/14/17  · The study is technically adequate for diagnosis.  · Left ventricular systolic function is low normal. Estimated EF = 45%.  · Left ventricular diastolic dysfunction (grade II) consistent with pseudonormalization.  · Mild aortic valve regurgitation is present.  · Mild mitral valve regurgitation is present  · There is no evidence of pericardial effusion.    Results for orders placed during the hospital encounter of 02/12/18    Adult  Transthoracic Echo Limited W/ Cont if Necessary Per Protocol    Interpretation Summary  · The study is technically good for diagnosis. The study is limited for EF.  · Left ventricular systolic function is normal. Estimated EF = 60%.  · There is no evidence of pericardial effusion.    Compared to the study of 8/14/2017, the EF is now normal.    Carotid US 9/2017  1. No significant plaque. No occlusion.  2. No hemodynamically significant stenosis oflow noted both vertebral arteries.     either RIGHT or LEFT ICA.    Carotid 3/2021  No hemodynamically significant plaques or stenoses were  demonstrated in the carotids. Vertebral artery flows are antegrade.    Assessment and Plan:     Coronary artery disease involving native coronary artery of native heart without angina pectoris  Hypercholesterolemia  -Continue ASA, beta-blocker, amlodipine, Repatha  -Prior intolerance to simvastatin, atorvastatin, rosuvastatin, Zetia     -PCP checks lipid panel regularly.  10/22 labs reviewed, acceptable    Chronic systolic heart failure  -Most recent EF 60%  -Continue beta-blocker and lisinopril    Bradycardia  -Stable on low-dose carvedilol.    Essential hypertension  -Continue current meds    Carotid bruit, unspecified laterality  -Continue clinical monitoring    - Return in about 1 year (around 4/3/2024) for Next scheduled follow-up with an ECG.      Emilio Sampson MD, MSc, [facility]  Interventional Cardiology  [facility] at [facility]

## 2023-04-03 NOTE — TELEPHONE ENCOUNTER
----- Message from Emilio Sampson MD sent at 4/3/2023 10:53 AM EDT -----  Needs refills of her heart medicines.  1 year.  [facility], [place], [place].  Thanks

## 2023-04-04 ENCOUNTER — TELEPHONE (OUTPATIENT)
Dept: CARDIOLOGY | Facility: CLINIC | Age: 73
End: 2023-04-04
Payer: MEDICARE

## 2023-04-04 NOTE — TELEPHONE ENCOUNTER
Patient called wondering if Dr. Sampson still wanted her to take Nabumetone.   Attempted to call patient back x2 to relay that Dr. Sampson did not prescribe that medication but no answer. Left voicemail to call back.

## 2023-04-24 RX ORDER — CARVEDILOL 3.12 MG/1
TABLET ORAL
Qty: 180 TABLET | Refills: 0 | Status: SHIPPED | OUTPATIENT
Start: 2023-04-24

## 2023-04-25 RX ORDER — EVOLOCUMAB 420 MG/3.5
420 KIT SUBCUTANEOUS
Qty: 6 ML | Refills: 3 | Status: SHIPPED | OUTPATIENT
Start: 2023-04-25

## 2023-08-01 RX ORDER — EVOLOCUMAB 420 MG/3.5
420 KIT SUBCUTANEOUS
Qty: 6 ML | Refills: 3 | Status: SHIPPED | OUTPATIENT
Start: 2023-08-01

## 2024-02-13 RX ORDER — EVOLOCUMAB 420 MG/3.5
420 KIT SUBCUTANEOUS
Qty: 3.5 ML | Refills: 0 | Status: SHIPPED | OUTPATIENT
Start: 2024-02-13

## 2024-02-26 RX ORDER — AMLODIPINE BESYLATE 10 MG/1
10 TABLET ORAL DAILY
Qty: 90 TABLET | Refills: 0 | Status: SHIPPED | OUTPATIENT
Start: 2024-02-26

## 2024-06-10 ENCOUNTER — LAB (OUTPATIENT)
Dept: LAB | Facility: HOSPITAL | Age: 74
End: 2024-06-10
Payer: MEDICARE

## 2024-06-10 ENCOUNTER — OFFICE VISIT (OUTPATIENT)
Dept: CARDIOLOGY | Facility: CLINIC | Age: 74
End: 2024-06-10
Payer: MEDICARE

## 2024-06-10 VITALS
SYSTOLIC BLOOD PRESSURE: 168 MMHG | WEIGHT: 121 LBS | DIASTOLIC BLOOD PRESSURE: 80 MMHG | HEART RATE: 47 BPM | BODY MASS INDEX: 22.26 KG/M2 | OXYGEN SATURATION: 97 % | HEIGHT: 62 IN

## 2024-06-10 DIAGNOSIS — I50.22 CHRONIC SYSTOLIC HEART FAILURE: ICD-10-CM

## 2024-06-10 DIAGNOSIS — E78.00 HYPERCHOLESTEROLEMIA: ICD-10-CM

## 2024-06-10 DIAGNOSIS — I25.10 CORONARY ARTERY DISEASE INVOLVING NATIVE CORONARY ARTERY OF NATIVE HEART WITHOUT ANGINA PECTORIS: ICD-10-CM

## 2024-06-10 DIAGNOSIS — I25.10 CORONARY ARTERY DISEASE INVOLVING NATIVE CORONARY ARTERY OF NATIVE HEART WITHOUT ANGINA PECTORIS: Primary | ICD-10-CM

## 2024-06-10 LAB
ALBUMIN SERPL-MCNC: 4.7 G/DL (ref 3.5–5.2)
ALBUMIN/GLOB SERPL: 1.6 G/DL
ALP SERPL-CCNC: 100 U/L (ref 39–117)
ALT SERPL W P-5'-P-CCNC: 19 U/L (ref 1–33)
ANION GAP SERPL CALCULATED.3IONS-SCNC: 10.5 MMOL/L (ref 5–15)
AST SERPL-CCNC: 20 U/L (ref 1–32)
BASOPHILS # BLD AUTO: 0.02 10*3/MM3 (ref 0–0.2)
BASOPHILS NFR BLD AUTO: 0.5 % (ref 0–1.5)
BILIRUB SERPL-MCNC: 0.8 MG/DL (ref 0–1.2)
BUN SERPL-MCNC: 14 MG/DL (ref 8–23)
BUN/CREAT SERPL: 17.3 (ref 7–25)
CALCIUM SPEC-SCNC: 9.5 MG/DL (ref 8.6–10.5)
CHLORIDE SERPL-SCNC: 104 MMOL/L (ref 98–107)
CHOLEST SERPL-MCNC: 127 MG/DL (ref 0–200)
CO2 SERPL-SCNC: 27.5 MMOL/L (ref 22–29)
CREAT SERPL-MCNC: 0.81 MG/DL (ref 0.57–1)
DEPRECATED RDW RBC AUTO: 42.2 FL (ref 37–54)
EGFRCR SERPLBLD CKD-EPI 2021: 76.3 ML/MIN/1.73
EOSINOPHIL # BLD AUTO: 0.09 10*3/MM3 (ref 0–0.4)
EOSINOPHIL NFR BLD AUTO: 2.2 % (ref 0.3–6.2)
ERYTHROCYTE [DISTWIDTH] IN BLOOD BY AUTOMATED COUNT: 14.4 % (ref 12.3–15.4)
GLOBULIN UR ELPH-MCNC: 2.9 GM/DL
GLUCOSE SERPL-MCNC: 128 MG/DL (ref 65–99)
HCT VFR BLD AUTO: 40.9 % (ref 34–46.6)
HDLC SERPL-MCNC: 53 MG/DL (ref 40–60)
HGB BLD-MCNC: 13.3 G/DL (ref 12–15.9)
IMM GRANULOCYTES # BLD AUTO: 0.02 10*3/MM3 (ref 0–0.05)
IMM GRANULOCYTES NFR BLD AUTO: 0.5 % (ref 0–0.5)
LDLC SERPL CALC-MCNC: 55 MG/DL (ref 0–100)
LDLC/HDLC SERPL: 1.02 {RATIO}
LYMPHOCYTES # BLD AUTO: 1.38 10*3/MM3 (ref 0.7–3.1)
LYMPHOCYTES NFR BLD AUTO: 33.7 % (ref 19.6–45.3)
MCH RBC QN AUTO: 26.5 PG (ref 26.6–33)
MCHC RBC AUTO-ENTMCNC: 32.5 G/DL (ref 31.5–35.7)
MCV RBC AUTO: 81.5 FL (ref 79–97)
MONOCYTES # BLD AUTO: 0.34 10*3/MM3 (ref 0.1–0.9)
MONOCYTES NFR BLD AUTO: 8.3 % (ref 5–12)
NEUTROPHILS NFR BLD AUTO: 2.24 10*3/MM3 (ref 1.7–7)
NEUTROPHILS NFR BLD AUTO: 54.8 % (ref 42.7–76)
NRBC BLD AUTO-RTO: 0 /100 WBC (ref 0–0.2)
PLATELET # BLD AUTO: 164 10*3/MM3 (ref 140–450)
PMV BLD AUTO: 11 FL (ref 6–12)
POTASSIUM SERPL-SCNC: 4.6 MMOL/L (ref 3.5–5.2)
PROT SERPL-MCNC: 7.6 G/DL (ref 6–8.5)
RBC # BLD AUTO: 5.02 10*6/MM3 (ref 3.77–5.28)
SODIUM SERPL-SCNC: 142 MMOL/L (ref 136–145)
TRIGL SERPL-MCNC: 100 MG/DL (ref 0–150)
VLDLC SERPL-MCNC: 19 MG/DL (ref 5–40)
WBC NRBC COR # BLD AUTO: 4.09 10*3/MM3 (ref 3.4–10.8)

## 2024-06-10 PROCEDURE — 80061 LIPID PANEL: CPT

## 2024-06-10 PROCEDURE — 3079F DIAST BP 80-89 MM HG: CPT | Performed by: INTERNAL MEDICINE

## 2024-06-10 PROCEDURE — 99214 OFFICE O/P EST MOD 30 MIN: CPT | Performed by: INTERNAL MEDICINE

## 2024-06-10 PROCEDURE — G2211 COMPLEX E/M VISIT ADD ON: HCPCS | Performed by: INTERNAL MEDICINE

## 2024-06-10 PROCEDURE — 36415 COLL VENOUS BLD VENIPUNCTURE: CPT

## 2024-06-10 PROCEDURE — 3077F SYST BP >= 140 MM HG: CPT | Performed by: INTERNAL MEDICINE

## 2024-06-10 PROCEDURE — 80053 COMPREHEN METABOLIC PANEL: CPT

## 2024-06-10 PROCEDURE — 85025 COMPLETE CBC W/AUTO DIFF WBC: CPT

## 2024-06-10 RX ORDER — ASPIRIN 81 MG/1
81 TABLET ORAL DAILY
Qty: 90 TABLET | Refills: 3 | Status: SHIPPED | OUTPATIENT
Start: 2024-06-10

## 2024-06-10 RX ORDER — AMLODIPINE BESYLATE 10 MG/1
10 TABLET ORAL DAILY
Qty: 90 TABLET | Refills: 3 | Status: SHIPPED | OUTPATIENT
Start: 2024-06-10

## 2024-06-10 RX ORDER — HYDROCHLOROTHIAZIDE 12.5 MG/1
12.5 CAPSULE, GELATIN COATED ORAL DAILY
Qty: 30 CAPSULE | Refills: 11 | Status: SHIPPED | OUTPATIENT
Start: 2024-06-10

## 2024-06-10 RX ORDER — LISINOPRIL 40 MG/1
40 TABLET ORAL DAILY
Qty: 90 TABLET | Refills: 3 | Status: SHIPPED | OUTPATIENT
Start: 2024-06-10

## 2024-06-10 NOTE — PROGRESS NOTES
[facility] AT [address], [address]  [place], [place], [address] [phone]  [email or web address]           OUTPATIENT CLINIC FOLLOW UP NOTE    Patient Care Team:  Patient Care Team:  Kristen Rock APRN as PCP - General (Nurse Practitioner)  Emilio Sampson MD as Consulting Physician (Cardiology)    Subjective:   Reason for consultation:   Chief Complaint   Patient presents with    Coronary artery disease involving native coronary artery of       HPI:    Liliya Granado is a 73 y.o. female.  Problem list:  CAD  Chest pain/NSTEMI 8/2017, 80% proximal LAD stenosis and tandem 50% distal LAD stenoses status post HARINDER x 2  Mild systolic heart failure after non-STEMI, EF has since normalized   EF 45% with mild hypokinesis of the mid to distal anterior segments and apical segments. 8/2017  EF 60% in 2018  Essential hypertension  Hyperlipidemia with an intolerance to statins, on Repatha  Carotid bruit   Without significant stenosis by ultrasound    The patient presents for follow-up.    She denies chest pain, shortness of breath, lower extremity edema, or palpitations.     Ran out of amlodipine a few days ago.  Has not taken it.    Chronic back discomfort, previously referred to Dr. Lowe at [facility] for degenerative disc disease.      Review of Systems:  As noted in the HPI    PFSH:  Patient Active Problem List   Diagnosis    Coronary artery disease involving native coronary artery of native heart without angina pectoris    Essential hypertension    Hypercholesterolemia    Chronic systolic heart failure         Current Outpatient Medications:     amLODIPine (NORVASC) 10 MG tablet, Take 1 tablet by mouth once daily, Disp: 90 tablet, Rfl: 0    aspirin 81 MG EC tablet, Take 1 tablet by mouth Daily., Disp: 90 tablet, Rfl: 3    escitalopram (LEXAPRO) 10 MG tablet, Take 1 tablet by mouth Daily. Pt takes half tablet prn, Disp: , Rfl:     lisinopril (PRINIVIL,ZESTRIL) 40 MG tablet,  "Take 1 tablet by mouth Daily., Disp: 90 tablet, Rfl: 3    nitroglycerin (NITROSTAT) 0.4 MG SL tablet, Place 1 tablet under the tongue Every 5 (Five) Minutes As Needed for Chest Pain. Take no more than 3 doses in 15 minutes., Disp: 25 tablet, Rfl: 2    Omega-3 Fatty Acids (FISH OIL) 1000 MG capsule capsule, Take 1 capsule by mouth Daily With Breakfast., Disp: , Rfl:     vitamin D (ERGOCALCIFEROL) 1.25 MG (54560 UT) capsule capsule, Every 7 (Seven) Days., Disp: , Rfl:     nabumetone (RELAFEN) 750 MG tablet, Take 1 tablet by mouth 2 (Two) Times a Day. (Patient taking differently: Take 1 tablet by mouth 2 (Two) Times a Day As Needed.), Disp: 60 tablet, Rfl: 0    pantoprazole (PROTONIX) 40 MG EC tablet, Take 1 tablet by mouth 2 (Two) Times a Day As Needed. (Patient not taking: Reported on 6/10/2024), Disp: , Rfl:     Allergies   Allergen Reactions    Codeine Rash        reports that she has never smoked. She has never been exposed to tobacco smoke. She has never used smokeless tobacco.      Objective:   Blood pressure 168/80, pulse (!) 47, height 157.5 cm (62.01\"), weight 54.9 kg (121 lb), SpO2 97%.  CONSTITUTIONAL: No acute distress, normal affect  CARDIOVASCULAR: Right carotid bruit, very soft.  Regular rate and rhythm with normal S1 and S2. Without murmur  PERIPHERAL VASCULAR: Normal radial pulse    3/2021 exam: 2+ DP pulses bilaterally    Labs:  Lab Results   Component Value Date    ALT 25 08/13/2017    AST 23 08/13/2017     Lab Results   Component Value Date    CHOL 107 10/09/2018    TRIG 87 10/09/2018    HDL 40 (L) 10/09/2018    CREATININE 0.70 08/16/2017     PCP labs 4/2020: , , HDL 53, LDL 65, AST 22, ALT 21, creatinine 0.74  PCP labs 1/2021: Creatinine 0.85, AST 21, ALT 18, , TG 95, HDL 57, LDL 50      Diagnostic Data:    Procedures    [facility] 8/15/17  There was severe 1 vessel coronary artery disease involving the LAD.  There is mild coronary artery disease of the left main, RCA.  There was an 80% " discrete stenosis of the proximal LAD followed by 50% tandem stenoses in the mid segment.  The lesions are now status post tandem drug-eluting stents.  A 2.75 x 38 mm drug-eluting stent was placed proximally and a 2.5 x 8 mm drug-eluting stent was placed distally with a brief segment of overlap.  The stents were postdilated with a 2.75 mm noncompliant balloon.  Mildly depressed left ventricular ejection fraction 45% with hypokinesis of the mid to distal anterior segments and apical segments    TTE 8/14/17  The study is technically adequate for diagnosis.  Left ventricular systolic function is low normal. Estimated EF = 45%.  Left ventricular diastolic dysfunction (grade II) consistent with pseudonormalization.  Mild aortic valve regurgitation is present.  Mild mitral valve regurgitation is present  There is no evidence of pericardial effusion.    Results for orders placed during the hospital encounter of 02/12/18    Adult Transthoracic Echo Limited W/ Cont if Necessary Per Protocol    Interpretation Summary  · The study is technically good for diagnosis. The study is limited for EF.  · Left ventricular systolic function is normal. Estimated EF = 60%.  · There is no evidence of pericardial effusion.    Compared to the study of 8/14/2017, the EF is now normal.    Carotid US 9/2017  1. No significant plaque. No occlusion.  2. No hemodynamically significant stenosis oflow noted both vertebral arteries.     either RIGHT or LEFT ICA.    Carotid 3/2021  No hemodynamically significant plaques or stenoses were  demonstrated in the carotids. Vertebral artery flows are antegrade.    Assessment and Plan:     Coronary artery disease  Hypercholesterolemia  Relative bradycardia  -Continue ASA, amlodipine, Repatha  -Discontinue carvedilol  -Not on a beta-blocker due to relative bradycardia  -Prior intolerance to simvastatin, atorvastatin, rosuvastatin, Zetia     -Has not had recent labs.  Repeating lipid panel today.  Patient is  fasting    Chronic systolic heart failure  -Most recent EF 60%  -Continue lisinopril.  Not on a beta-blocker due to relative bradycardia    Essential hypertension  -BP elevated due to not being on amlodipine for the last few days  -Switch carvedilol to hydrochlorothiazide    Carotid bruit, unspecified laterality  -Continue clinical monitoring    - Return in about 1 year (around 6/10/2025) for Next scheduled follow-up with an ECG.      Emilio Sampson MD, MSc, [facility]  Interventional Cardiology  [facility]

## 2024-06-11 ENCOUNTER — TELEPHONE (OUTPATIENT)
Dept: CARDIOLOGY | Facility: CLINIC | Age: 74
End: 2024-06-11
Payer: MEDICARE

## 2024-06-11 NOTE — TELEPHONE ENCOUNTER
----- Message from Emilio Sampson sent at 6/11/2024 11:37 AM EDT -----  Cholesterol levels look good  ----- Message -----  From: Lab, Background User  Sent: 6/10/2024   2:32 PM EDT  To: Emilio Sampson MD

## 2025-07-14 ENCOUNTER — OFFICE VISIT (OUTPATIENT)
Dept: CARDIOLOGY | Facility: CLINIC | Age: 75
End: 2025-07-14
Payer: MEDICARE

## 2025-07-14 VITALS
OXYGEN SATURATION: 96 % | HEIGHT: 62 IN | BODY MASS INDEX: 22.08 KG/M2 | HEART RATE: 53 BPM | DIASTOLIC BLOOD PRESSURE: 70 MMHG | SYSTOLIC BLOOD PRESSURE: 132 MMHG | WEIGHT: 120 LBS

## 2025-07-14 DIAGNOSIS — I10 ESSENTIAL HYPERTENSION: ICD-10-CM

## 2025-07-14 DIAGNOSIS — I50.22 CHRONIC SYSTOLIC HEART FAILURE: ICD-10-CM

## 2025-07-14 DIAGNOSIS — I25.10 CORONARY ARTERY DISEASE INVOLVING NATIVE CORONARY ARTERY OF NATIVE HEART WITHOUT ANGINA PECTORIS: Primary | ICD-10-CM

## 2025-07-14 PROCEDURE — 3075F SYST BP GE 130 - 139MM HG: CPT | Performed by: HOSPITALIST

## 2025-07-14 PROCEDURE — 3078F DIAST BP <80 MM HG: CPT | Performed by: HOSPITALIST

## 2025-07-14 PROCEDURE — 1160F RVW MEDS BY RX/DR IN RCRD: CPT | Performed by: HOSPITALIST

## 2025-07-14 PROCEDURE — 1159F MED LIST DOCD IN RCRD: CPT | Performed by: HOSPITALIST

## 2025-07-14 PROCEDURE — 93000 ELECTROCARDIOGRAM COMPLETE: CPT | Performed by: HOSPITALIST

## 2025-07-14 PROCEDURE — 99214 OFFICE O/P EST MOD 30 MIN: CPT | Performed by: HOSPITALIST

## 2025-07-14 NOTE — PROGRESS NOTES
[facility] AT [address], [address]  [place], [place], [address] [phone]  [facility]           OUTPATIENT CLINIC FOLLOW UP NOTE    Patient Care Team:  Patient Care Team:  Kristen Rock APRN as PCP - General (Nurse Practitioner)  Emilio Sampson MD as Consulting Physician (Cardiology)    Subjective:   Reason for consultation:   Chief Complaint   Patient presents with    Coronary artery disease involving native coronary artery of       HPI:    Liliya Granado is a 74 y.o. female.  Problem list:  CAD  Chest pain/NSTEMI 8/2017, 80% proximal LAD stenosis and tandem 50% distal LAD stenoses status post HARINDER x 2  Mild systolic heart failure after non-STEMI, EF has since normalized   EF 45% with mild hypokinesis of the mid to distal anterior segments and apical segments. 8/2017  EF 60% in 2018  Essential hypertension  Hyperlipidemia with an intolerance to statins, on Repatha  Carotid bruit   Without significant stenosis by ultrasound    The patient presents for follow-up.  Doing well from a cardiac standpoint since her last visit.  Remains active without cardiopulmonary symptoms.  Denies chest pain, shortness of breath, palpitations or edema.      Review of Systems:  As noted in the HPI    PFSH:  Patient Active Problem List   Diagnosis    Coronary artery disease involving native coronary artery of native heart without angina pectoris    Essential hypertension    Hypercholesterolemia    Chronic systolic heart failure         Current Outpatient Medications:     amLODIPine (NORVASC) 10 MG tablet, Take 1 tablet by mouth Daily., Disp: 90 tablet, Rfl: 3    aspirin 81 MG EC tablet, Take 1 tablet by mouth Daily., Disp: 90 tablet, Rfl: 3    escitalopram (LEXAPRO) 10 MG tablet, Take 1 tablet by mouth Daily. Pt takes half tablet prn, Disp: , Rfl:     Evolocumab (REPATHA) solution auto-injector SureClick injection, Inject 1 mL under the skin into the appropriate area as directed  "Every 14 (Fourteen) Days., Disp: 6 mL, Rfl: 3    hydroCHLOROthiazide (MICROZIDE) 12.5 MG capsule, Take 1 capsule by mouth Daily., Disp: 30 capsule, Rfl: 11    lisinopril (PRINIVIL,ZESTRIL) 40 MG tablet, Take 1 tablet by mouth Daily., Disp: 90 tablet, Rfl: 3    nabumetone (RELAFEN) 750 MG tablet, Take 1 tablet by mouth 2 (Two) Times a Day. (Patient taking differently: Take 1 tablet by mouth 2 (Two) Times a Day As Needed.), Disp: 60 tablet, Rfl: 0    nitroglycerin (NITROSTAT) 0.4 MG SL tablet, Place 1 tablet under the tongue Every 5 (Five) Minutes As Needed for Chest Pain. Take no more than 3 doses in 15 minutes., Disp: 25 tablet, Rfl: 2    Omega-3 Fatty Acids (FISH OIL) 1000 MG capsule capsule, Take 1 capsule by mouth Daily With Breakfast., Disp: , Rfl:     vitamin D (ERGOCALCIFEROL) 1.25 MG (86891 UT) capsule capsule, Every 7 (Seven) Days., Disp: , Rfl:     Allergies   Allergen Reactions    Codeine Rash        reports that she has never smoked. She has never been exposed to tobacco smoke. She has never used smokeless tobacco.      Objective:   Blood pressure 132/70, pulse 53, height 157.5 cm (62\"), weight 54.4 kg (120 lb), SpO2 96%.  CONSTITUTIONAL: No acute distress, normal affect  CARDIOVASCULAR: Right carotid bruit, very soft.  Regular rate and rhythm with normal S1 and S2. Without murmur  PERIPHERAL VASCULAR: Normal radial pulse    3/2021 exam: 2+ DP pulses bilaterally    Labs:  Lab Results   Component Value Date    ALT 19 06/10/2024    AST 20 06/10/2024     Lab Results   Component Value Date    CHOL 127 06/10/2024    TRIG 100 06/10/2024    HDL 53 06/10/2024    CREATININE 0.81 06/10/2024     PCP labs 4/2020: , , HDL 53, LDL 65, AST 22, ALT 21, creatinine 0.74  PCP labs 1/2021: Creatinine 0.85, AST 21, ALT 18, , TG 95, HDL 57, LDL 50    PCP labs, 2/25/2025  CMP: Glu 136, BUN 13, creatinine 0.85, Na 142, K 4.1, AST 22, ALT 17, eGFR 71  Lipid panel:  , TG 82, HDL 52, LDL 51  CBC:  WBC 3.1, " Hgb 13.1, Hct 40.9, plt 160    Diagnostic Data:      ECG 12 Lead    Date/Time: 7/14/2025 10:07 AM  Performed by: Katia Rueda APRN    Authorized by: Katia Rueda APRN  Comparison: compared with previous ECG from 6/10/2024  Similar to previous ECG  Rhythm: sinus bradycardia  Rate: bradycardic  BPM: 53  Conduction: 1st degree AV block            Results for orders placed during the hospital encounter of 02/12/18    Adult Transthoracic Echo Limited W/ Cont if Necessary Per Protocol 02/12/2018  2:17 PM    Interpretation Summary  · The study is technically good for diagnosis. The study is limited for EF.  · Left ventricular systolic function is normal. Estimated EF = 60%.  · There is no evidence of pericardial effusion.    Compared to the study of 8/14/2017, the EF is now normal.      Assessment and Plan:     Coronary artery disease  Hypercholesterolemia  Relative bradycardia  -Currently without angina.  Stable EKG today.   -Continue ASA, amlodipine, Repatha  -Not on a beta-blocker due to relative bradycardia  -Prior intolerance to simvastatin, atorvastatin, rosuvastatin, Zetia   -Most recent LDL 51, 2/2025.     Chronic systolic heart failure  -Most recent EF 60%  -Continue lisinopril.  Not on a beta-blocker due to relative bradycardia    Essential hypertension  -Blood pressure at goal.   -Continue current related medications.     Carotid bruit, unspecified laterality  -Continue clinical monitoring    - Return in about 1 year (around 7/14/2026) for Next scheduled follow up with Dr. Sampson, EKG .

## (undated) DEVICE — DRSNG SURESITE123 4X4.8IN

## (undated) DEVICE — TREK CORONARY DILATATION CATHETER 2.50 MM X 15 MM / RAPID-EXCHANGE: Brand: TREK

## (undated) DEVICE — Device: Brand: ASAHI SION BLUE

## (undated) DEVICE — PK CATH CARD 10

## (undated) DEVICE — PINNACLE INTRODUCER SHEATH: Brand: PINNACLE

## (undated) DEVICE — CATH IMG IVUS EAGLE EYE PLATIN RX DIGITAL .014IN 5FR

## (undated) DEVICE — A2000 MULTI-USE SYRINGE KIT, P/N 701277-003KIT CONTENTS: 100ML CONTRAST RESERVOIR AND TUBING WITH CONTRAST SPIKE AND CLAMP: Brand: A2000 MULTI-USE SYRINGE KIT

## (undated) DEVICE — CATH DIAG EXPO M/ PK 6FR FL4/FR4 PIG 3PK

## (undated) DEVICE — ANGIO-SEAL EVOLUTION VASCULAR CLOSURE DEVICE: Brand: ANGIO-SEAL

## (undated) DEVICE — DEV INFL MONARCH 25W

## (undated) DEVICE — MODEL AT P54, P/N 700608-035KIT CONTENTS: HAND CONTROLLER, 3-WAY HIGH-PRESSURE STOPCOCK WITH ROTATING END AND PREMIUM HIGH-PRESSURE TUBING: Brand: ANGIOTOUCH® KIT

## (undated) DEVICE — MODEL BT2000 P/N 700287-012KIT CONTENTS: MANIFOLD WITH SALINE AND CONTRAST PORTS, SALINE TUBING WITH SPIKE AND HAND SYRINGE, TRANSDUCER: Brand: BT2000 AUTOMATED MANIFOLD KIT

## (undated) DEVICE — GW J TP FIX CORE .035 150

## (undated) DEVICE — ST INF PRI SMRTSTE 20DRP 2VLV 24ML 117

## (undated) DEVICE — GUIDE CATHETER: Brand: MACH1™

## (undated) DEVICE — NC TREK CORONARY DILATATION CATHETER 2.75 MM X 15 MM / RAPID-EXCHANGE: Brand: NC TREK